# Patient Record
Sex: FEMALE | Race: WHITE | NOT HISPANIC OR LATINO | Employment: UNEMPLOYED | URBAN - METROPOLITAN AREA
[De-identification: names, ages, dates, MRNs, and addresses within clinical notes are randomized per-mention and may not be internally consistent; named-entity substitution may affect disease eponyms.]

---

## 2017-11-28 ENCOUNTER — HOSPITAL ENCOUNTER (INPATIENT)
Facility: MEDICAL CENTER | Age: 57
LOS: 3 days | DRG: 190 | End: 2017-12-02
Attending: EMERGENCY MEDICINE | Admitting: HOSPITALIST
Payer: COMMERCIAL

## 2017-11-28 ENCOUNTER — HOSPITAL ENCOUNTER (OUTPATIENT)
Dept: RADIOLOGY | Facility: MEDICAL CENTER | Age: 57
End: 2017-11-28

## 2017-11-28 ENCOUNTER — APPOINTMENT (OUTPATIENT)
Dept: RADIOLOGY | Facility: MEDICAL CENTER | Age: 57
DRG: 190 | End: 2017-11-28
Attending: EMERGENCY MEDICINE
Payer: COMMERCIAL

## 2017-11-28 DIAGNOSIS — J44.1 COPD EXACERBATION (HCC): Primary | ICD-10-CM

## 2017-11-28 LAB
BASOPHILS # BLD AUTO: 0.4 % (ref 0–1.8)
BASOPHILS # BLD: 0.04 K/UL (ref 0–0.12)
EOSINOPHIL # BLD AUTO: 0.01 K/UL (ref 0–0.51)
EOSINOPHIL NFR BLD: 0.1 % (ref 0–6.9)
ERYTHROCYTE [DISTWIDTH] IN BLOOD BY AUTOMATED COUNT: 41.7 FL (ref 35.9–50)
HCT VFR BLD AUTO: 34.1 % (ref 37–47)
HGB BLD-MCNC: 11.3 G/DL (ref 12–16)
IMM GRANULOCYTES # BLD AUTO: 0.03 K/UL (ref 0–0.11)
IMM GRANULOCYTES NFR BLD AUTO: 0.3 % (ref 0–0.9)
LYMPHOCYTES # BLD AUTO: 0.6 K/UL (ref 1–4.8)
LYMPHOCYTES NFR BLD: 6.7 % (ref 22–41)
MCH RBC QN AUTO: 29.2 PG (ref 27–33)
MCHC RBC AUTO-ENTMCNC: 33.1 G/DL (ref 33.6–35)
MCV RBC AUTO: 88.1 FL (ref 81.4–97.8)
MONOCYTES # BLD AUTO: 0.12 K/UL (ref 0–0.85)
MONOCYTES NFR BLD AUTO: 1.3 % (ref 0–13.4)
NEUTROPHILS # BLD AUTO: 8.12 K/UL (ref 2–7.15)
NEUTROPHILS NFR BLD: 91.2 % (ref 44–72)
NRBC # BLD AUTO: 0 K/UL
NRBC BLD AUTO-RTO: 0 /100 WBC
PLATELET # BLD AUTO: 268 K/UL (ref 164–446)
PMV BLD AUTO: 10.8 FL (ref 9–12.9)
RBC # BLD AUTO: 3.87 M/UL (ref 4.2–5.4)
TROPONIN I SERPL-MCNC: <0.01 NG/ML (ref 0–0.04)
WBC # BLD AUTO: 8.9 K/UL (ref 4.8–10.8)

## 2017-11-28 PROCEDURE — 71010 DX-CHEST-PORTABLE (1 VIEW): CPT

## 2017-11-28 PROCEDURE — 85025 COMPLETE CBC W/AUTO DIFF WBC: CPT

## 2017-11-28 PROCEDURE — 83036 HEMOGLOBIN GLYCOSYLATED A1C: CPT

## 2017-11-28 PROCEDURE — 94640 AIRWAY INHALATION TREATMENT: CPT

## 2017-11-28 PROCEDURE — 99285 EMERGENCY DEPT VISIT HI MDM: CPT

## 2017-11-28 PROCEDURE — 304561 HCHG STAT O2

## 2017-11-28 PROCEDURE — 84484 ASSAY OF TROPONIN QUANT: CPT

## 2017-11-28 PROCEDURE — 80053 COMPREHEN METABOLIC PANEL: CPT

## 2017-11-28 PROCEDURE — 93005 ELECTROCARDIOGRAM TRACING: CPT | Performed by: EMERGENCY MEDICINE

## 2017-11-28 PROCEDURE — 36415 COLL VENOUS BLD VENIPUNCTURE: CPT

## 2017-11-28 PROCEDURE — 304562 HCHG STAT O2 MASK/CANNULA

## 2017-11-28 PROCEDURE — 83880 ASSAY OF NATRIURETIC PEPTIDE: CPT

## 2017-11-28 PROCEDURE — 700101 HCHG RX REV CODE 250: Performed by: EMERGENCY MEDICINE

## 2017-11-28 RX ORDER — SODIUM CHLORIDE 9 MG/ML
1000 INJECTION, SOLUTION INTRAVENOUS ONCE
Status: DISCONTINUED | OUTPATIENT
Start: 2017-11-28 | End: 2017-11-28

## 2017-11-28 RX ORDER — IPRATROPIUM BROMIDE AND ALBUTEROL SULFATE 2.5; .5 MG/3ML; MG/3ML
3 SOLUTION RESPIRATORY (INHALATION)
Status: COMPLETED | OUTPATIENT
Start: 2017-11-28 | End: 2017-11-28

## 2017-11-28 RX ADMIN — IPRATROPIUM BROMIDE AND ALBUTEROL SULFATE 3 ML: .5; 3 SOLUTION RESPIRATORY (INHALATION) at 22:28

## 2017-11-28 ASSESSMENT — COPD QUESTIONNAIRES
DO YOU EVER COUGH UP ANY MUCUS OR PHLEGM?: YES, A FEW DAYS A WEEK OR MONTH
HAVE YOU SMOKED AT LEAST 100 CIGARETTES IN YOUR ENTIRE LIFE: YES
COPD SCREENING SCORE: 5
DURING THE PAST 4 WEEKS HOW MUCH DID YOU FEEL SHORT OF BREATH: SOME OF THE TIME

## 2017-11-28 ASSESSMENT — LIFESTYLE VARIABLES: EVER_SMOKED: YES

## 2017-11-28 ASSESSMENT — PAIN SCALES - GENERAL: PAINLEVEL_OUTOF10: 4

## 2017-11-29 ENCOUNTER — RESOLUTE PROFESSIONAL BILLING HOSPITAL PROF FEE (OUTPATIENT)
Dept: HOSPITALIST | Facility: MEDICAL CENTER | Age: 57
End: 2017-11-29
Payer: COMMERCIAL

## 2017-11-29 PROBLEM — J44.1 COPD EXACERBATION (HCC): Status: ACTIVE | Noted: 2017-11-29

## 2017-11-29 PROBLEM — J96.21 ACUTE ON CHRONIC RESPIRATORY FAILURE WITH HYPOXIA (HCC): Status: ACTIVE | Noted: 2017-11-29

## 2017-11-29 PROBLEM — F17.200 TOBACCO DEPENDENCE: Status: ACTIVE | Noted: 2017-11-29

## 2017-11-29 LAB
ALBUMIN SERPL BCP-MCNC: 3.7 G/DL (ref 3.2–4.9)
ALBUMIN/GLOB SERPL: 1.2 G/DL
ALP SERPL-CCNC: 122 U/L (ref 30–99)
ALT SERPL-CCNC: 11 U/L (ref 2–50)
ANION GAP SERPL CALC-SCNC: 15 MMOL/L (ref 0–11.9)
ANION GAP SERPL CALC-SCNC: 8 MMOL/L (ref 0–11.9)
AST SERPL-CCNC: 13 U/L (ref 12–45)
BASOPHILS # BLD AUTO: 0.3 % (ref 0–1.8)
BASOPHILS # BLD: 0.02 K/UL (ref 0–0.12)
BILIRUB SERPL-MCNC: 0.3 MG/DL (ref 0.1–1.5)
BNP SERPL-MCNC: 31 PG/ML (ref 0–100)
BUN SERPL-MCNC: 12 MG/DL (ref 8–22)
BUN SERPL-MCNC: 16 MG/DL (ref 8–22)
CALCIUM SERPL-MCNC: 9.5 MG/DL (ref 8.5–10.5)
CALCIUM SERPL-MCNC: 9.5 MG/DL (ref 8.5–10.5)
CHLORIDE SERPL-SCNC: 101 MMOL/L (ref 96–112)
CHLORIDE SERPL-SCNC: 104 MMOL/L (ref 96–112)
CO2 SERPL-SCNC: 19 MMOL/L (ref 20–33)
CO2 SERPL-SCNC: 24 MMOL/L (ref 20–33)
CREAT SERPL-MCNC: 0.59 MG/DL (ref 0.5–1.4)
CREAT SERPL-MCNC: 0.77 MG/DL (ref 0.5–1.4)
EOSINOPHIL # BLD AUTO: 0 K/UL (ref 0–0.51)
EOSINOPHIL NFR BLD: 0 % (ref 0–6.9)
ERYTHROCYTE [DISTWIDTH] IN BLOOD BY AUTOMATED COUNT: 41.1 FL (ref 35.9–50)
GFR SERPL CREATININE-BSD FRML MDRD: >60 ML/MIN/1.73 M 2
GFR SERPL CREATININE-BSD FRML MDRD: >60 ML/MIN/1.73 M 2
GLOBULIN SER CALC-MCNC: 3.1 G/DL (ref 1.9–3.5)
GLUCOSE BLD-MCNC: 263 MG/DL (ref 65–99)
GLUCOSE BLD-MCNC: 287 MG/DL (ref 65–99)
GLUCOSE BLD-MCNC: 325 MG/DL (ref 65–99)
GLUCOSE BLD-MCNC: 357 MG/DL (ref 65–99)
GLUCOSE SERPL-MCNC: 285 MG/DL (ref 65–99)
GLUCOSE SERPL-MCNC: 420 MG/DL (ref 65–99)
HCT VFR BLD AUTO: 35.1 % (ref 37–47)
HGB BLD-MCNC: 11.8 G/DL (ref 12–16)
IMM GRANULOCYTES # BLD AUTO: 0.04 K/UL (ref 0–0.11)
IMM GRANULOCYTES NFR BLD AUTO: 0.5 % (ref 0–0.9)
LYMPHOCYTES # BLD AUTO: 0.69 K/UL (ref 1–4.8)
LYMPHOCYTES NFR BLD: 9.3 % (ref 22–41)
MCH RBC QN AUTO: 29.2 PG (ref 27–33)
MCHC RBC AUTO-ENTMCNC: 33.6 G/DL (ref 33.6–35)
MCV RBC AUTO: 86.9 FL (ref 81.4–97.8)
MONOCYTES # BLD AUTO: 0.2 K/UL (ref 0–0.85)
MONOCYTES NFR BLD AUTO: 2.7 % (ref 0–13.4)
NEUTROPHILS # BLD AUTO: 6.43 K/UL (ref 2–7.15)
NEUTROPHILS NFR BLD: 87.2 % (ref 44–72)
NRBC # BLD AUTO: 0 K/UL
NRBC BLD AUTO-RTO: 0 /100 WBC
PLATELET # BLD AUTO: 285 K/UL (ref 164–446)
PMV BLD AUTO: 10.4 FL (ref 9–12.9)
POTASSIUM SERPL-SCNC: 3.6 MMOL/L (ref 3.6–5.5)
POTASSIUM SERPL-SCNC: 4.5 MMOL/L (ref 3.6–5.5)
PROT SERPL-MCNC: 6.8 G/DL (ref 6–8.2)
RBC # BLD AUTO: 4.04 M/UL (ref 4.2–5.4)
SODIUM SERPL-SCNC: 135 MMOL/L (ref 135–145)
SODIUM SERPL-SCNC: 136 MMOL/L (ref 135–145)
WBC # BLD AUTO: 7.4 K/UL (ref 4.8–10.8)

## 2017-11-29 PROCEDURE — A9270 NON-COVERED ITEM OR SERVICE: HCPCS | Performed by: HOSPITALIST

## 2017-11-29 PROCEDURE — 700105 HCHG RX REV CODE 258: Performed by: HOSPITALIST

## 2017-11-29 PROCEDURE — 700102 HCHG RX REV CODE 250 W/ 637 OVERRIDE(OP): Performed by: HOSPITALIST

## 2017-11-29 PROCEDURE — 770006 HCHG ROOM/CARE - MED/SURG/GYN SEMI*

## 2017-11-29 PROCEDURE — 94760 N-INVAS EAR/PLS OXIMETRY 1: CPT

## 2017-11-29 PROCEDURE — 82962 GLUCOSE BLOOD TEST: CPT | Mod: 91

## 2017-11-29 PROCEDURE — 36415 COLL VENOUS BLD VENIPUNCTURE: CPT

## 2017-11-29 PROCEDURE — 99406 BEHAV CHNG SMOKING 3-10 MIN: CPT

## 2017-11-29 PROCEDURE — 94640 AIRWAY INHALATION TREATMENT: CPT

## 2017-11-29 PROCEDURE — 700117 HCHG RX CONTRAST REV CODE 255: Performed by: EMERGENCY MEDICINE

## 2017-11-29 PROCEDURE — 700101 HCHG RX REV CODE 250: Performed by: EMERGENCY MEDICINE

## 2017-11-29 PROCEDURE — 85025 COMPLETE CBC W/AUTO DIFF WBC: CPT

## 2017-11-29 PROCEDURE — 71275 CT ANGIOGRAPHY CHEST: CPT

## 2017-11-29 PROCEDURE — 80048 BASIC METABOLIC PNL TOTAL CA: CPT

## 2017-11-29 PROCEDURE — 99223 1ST HOSP IP/OBS HIGH 75: CPT | Performed by: HOSPITALIST

## 2017-11-29 PROCEDURE — 700111 HCHG RX REV CODE 636 W/ 250 OVERRIDE (IP): Performed by: HOSPITALIST

## 2017-11-29 RX ORDER — ONDANSETRON 4 MG/1
4 TABLET, ORALLY DISINTEGRATING ORAL EVERY 4 HOURS PRN
Status: DISCONTINUED | OUTPATIENT
Start: 2017-11-29 | End: 2017-12-02 | Stop reason: HOSPADM

## 2017-11-29 RX ORDER — AZITHROMYCIN 250 MG/1
250 TABLET, FILM COATED ORAL DAILY
Status: DISCONTINUED | OUTPATIENT
Start: 2017-11-30 | End: 2017-12-02 | Stop reason: HOSPADM

## 2017-11-29 RX ORDER — ACETAMINOPHEN 325 MG/1
650 TABLET ORAL EVERY 6 HOURS PRN
Status: DISCONTINUED | OUTPATIENT
Start: 2017-11-29 | End: 2017-12-02 | Stop reason: HOSPADM

## 2017-11-29 RX ORDER — DEXTROSE MONOHYDRATE 25 G/50ML
25 INJECTION, SOLUTION INTRAVENOUS
Status: DISCONTINUED | OUTPATIENT
Start: 2017-11-29 | End: 2017-12-02 | Stop reason: HOSPADM

## 2017-11-29 RX ORDER — LOSARTAN POTASSIUM 50 MG/1
50 TABLET ORAL DAILY
Status: DISCONTINUED | OUTPATIENT
Start: 2017-11-29 | End: 2017-12-02 | Stop reason: HOSPADM

## 2017-11-29 RX ORDER — BUPROPION HYDROCHLORIDE 150 MG/1
150 TABLET, EXTENDED RELEASE ORAL 2 TIMES DAILY
Status: DISCONTINUED | OUTPATIENT
Start: 2017-11-29 | End: 2017-12-02 | Stop reason: HOSPADM

## 2017-11-29 RX ORDER — OXYCODONE HYDROCHLORIDE 5 MG/1
5 TABLET ORAL
Status: DISCONTINUED | OUTPATIENT
Start: 2017-11-29 | End: 2017-12-02 | Stop reason: HOSPADM

## 2017-11-29 RX ORDER — INSULIN GLARGINE 100 [IU]/ML
30 INJECTION, SOLUTION SUBCUTANEOUS 2 TIMES DAILY
Status: DISCONTINUED | OUTPATIENT
Start: 2017-11-29 | End: 2017-12-02 | Stop reason: HOSPADM

## 2017-11-29 RX ORDER — METHYLPREDNISOLONE SODIUM SUCCINATE 40 MG/ML
40 INJECTION, POWDER, LYOPHILIZED, FOR SOLUTION INTRAMUSCULAR; INTRAVENOUS EVERY 6 HOURS
Status: DISCONTINUED | OUTPATIENT
Start: 2017-11-29 | End: 2017-11-30

## 2017-11-29 RX ORDER — OMEPRAZOLE 20 MG/1
20 CAPSULE, DELAYED RELEASE ORAL DAILY
Status: DISCONTINUED | OUTPATIENT
Start: 2017-11-29 | End: 2017-12-02 | Stop reason: HOSPADM

## 2017-11-29 RX ORDER — HEPARIN SODIUM 5000 [USP'U]/ML
5000 INJECTION, SOLUTION INTRAVENOUS; SUBCUTANEOUS EVERY 8 HOURS
Status: DISCONTINUED | OUTPATIENT
Start: 2017-11-29 | End: 2017-12-02 | Stop reason: HOSPADM

## 2017-11-29 RX ORDER — SODIUM CHLORIDE 9 MG/ML
INJECTION, SOLUTION INTRAVENOUS CONTINUOUS
Status: ACTIVE | OUTPATIENT
Start: 2017-11-29 | End: 2017-11-29

## 2017-11-29 RX ORDER — BISACODYL 10 MG
10 SUPPOSITORY, RECTAL RECTAL
Status: DISCONTINUED | OUTPATIENT
Start: 2017-11-29 | End: 2017-12-02 | Stop reason: HOSPADM

## 2017-11-29 RX ORDER — TIOTROPIUM BROMIDE 18 UG/1
1 CAPSULE ORAL; RESPIRATORY (INHALATION) DAILY
Status: DISCONTINUED | OUTPATIENT
Start: 2017-11-29 | End: 2017-12-02 | Stop reason: HOSPADM

## 2017-11-29 RX ORDER — ESCITALOPRAM OXALATE 10 MG/1
20 TABLET ORAL DAILY
Status: DISCONTINUED | OUTPATIENT
Start: 2017-11-29 | End: 2017-12-02 | Stop reason: HOSPADM

## 2017-11-29 RX ORDER — LORAZEPAM 2 MG/ML
0.5 INJECTION INTRAMUSCULAR EVERY 6 HOURS PRN
Status: DISCONTINUED | OUTPATIENT
Start: 2017-11-29 | End: 2017-12-02 | Stop reason: HOSPADM

## 2017-11-29 RX ORDER — PROMETHAZINE HYDROCHLORIDE 25 MG/1
12.5-25 SUPPOSITORY RECTAL EVERY 4 HOURS PRN
Status: DISCONTINUED | OUTPATIENT
Start: 2017-11-29 | End: 2017-12-02 | Stop reason: HOSPADM

## 2017-11-29 RX ORDER — GABAPENTIN 300 MG/1
300 CAPSULE ORAL 3 TIMES DAILY
Status: DISCONTINUED | OUTPATIENT
Start: 2017-11-29 | End: 2017-12-02 | Stop reason: HOSPADM

## 2017-11-29 RX ORDER — IPRATROPIUM BROMIDE AND ALBUTEROL SULFATE 2.5; .5 MG/3ML; MG/3ML
3 SOLUTION RESPIRATORY (INHALATION)
Status: DISCONTINUED | OUTPATIENT
Start: 2017-11-29 | End: 2017-11-30

## 2017-11-29 RX ORDER — POLYETHYLENE GLYCOL 3350 17 G/17G
1 POWDER, FOR SOLUTION ORAL
Status: DISCONTINUED | OUTPATIENT
Start: 2017-11-29 | End: 2017-12-02 | Stop reason: HOSPADM

## 2017-11-29 RX ORDER — ONDANSETRON 2 MG/ML
4 INJECTION INTRAMUSCULAR; INTRAVENOUS EVERY 4 HOURS PRN
Status: DISCONTINUED | OUTPATIENT
Start: 2017-11-29 | End: 2017-12-02 | Stop reason: HOSPADM

## 2017-11-29 RX ORDER — LORAZEPAM 1 MG/1
0.5 TABLET ORAL EVERY 6 HOURS PRN
Status: DISCONTINUED | OUTPATIENT
Start: 2017-11-29 | End: 2017-12-02 | Stop reason: HOSPADM

## 2017-11-29 RX ORDER — NICOTINE 21 MG/24HR
14 PATCH, TRANSDERMAL 24 HOURS TRANSDERMAL
Status: DISCONTINUED | OUTPATIENT
Start: 2017-11-29 | End: 2017-12-02 | Stop reason: HOSPADM

## 2017-11-29 RX ORDER — LEVOTHYROXINE SODIUM 0.05 MG/1
50 TABLET ORAL
Status: DISCONTINUED | OUTPATIENT
Start: 2017-11-29 | End: 2017-12-02 | Stop reason: HOSPADM

## 2017-11-29 RX ORDER — CYCLOBENZAPRINE HCL 10 MG
10 TABLET ORAL 3 TIMES DAILY PRN
Status: DISCONTINUED | OUTPATIENT
Start: 2017-11-29 | End: 2017-12-02 | Stop reason: HOSPADM

## 2017-11-29 RX ORDER — AMOXICILLIN 250 MG
2 CAPSULE ORAL 2 TIMES DAILY
Status: DISCONTINUED | OUTPATIENT
Start: 2017-11-29 | End: 2017-12-02 | Stop reason: HOSPADM

## 2017-11-29 RX ORDER — FLUTICASONE PROPIONATE 50 MCG
1 SPRAY, SUSPENSION (ML) NASAL 2 TIMES DAILY
Status: DISCONTINUED | OUTPATIENT
Start: 2017-11-29 | End: 2017-12-02 | Stop reason: HOSPADM

## 2017-11-29 RX ORDER — AZITHROMYCIN 250 MG/1
500 TABLET, FILM COATED ORAL ONCE
Status: COMPLETED | OUTPATIENT
Start: 2017-11-29 | End: 2017-11-29

## 2017-11-29 RX ORDER — PROMETHAZINE HYDROCHLORIDE 25 MG/1
12.5-25 TABLET ORAL EVERY 4 HOURS PRN
Status: DISCONTINUED | OUTPATIENT
Start: 2017-11-29 | End: 2017-12-02 | Stop reason: HOSPADM

## 2017-11-29 RX ADMIN — IOHEXOL 75 ML: 350 INJECTION, SOLUTION INTRAVENOUS at 01:00

## 2017-11-29 RX ADMIN — OMEPRAZOLE 20 MG: 20 CAPSULE, DELAYED RELEASE ORAL at 08:10

## 2017-11-29 RX ADMIN — INSULIN HUMAN 7 UNITS: 100 INJECTION, SOLUTION PARENTERAL at 13:40

## 2017-11-29 RX ADMIN — SODIUM CHLORIDE: 9 INJECTION, SOLUTION INTRAVENOUS at 03:38

## 2017-11-29 RX ADMIN — IPRATROPIUM BROMIDE AND ALBUTEROL SULFATE 3 ML: .5; 3 SOLUTION RESPIRATORY (INHALATION) at 14:11

## 2017-11-29 RX ADMIN — INSULIN HUMAN 7 UNITS: 100 INJECTION, SOLUTION PARENTERAL at 21:19

## 2017-11-29 RX ADMIN — ESCITALOPRAM OXALATE 20 MG: 10 TABLET ORAL at 09:00

## 2017-11-29 RX ADMIN — HEPARIN SODIUM 5000 UNITS: 5000 INJECTION, SOLUTION INTRAVENOUS; SUBCUTANEOUS at 21:16

## 2017-11-29 RX ADMIN — OXYCODONE HYDROCHLORIDE 5 MG: 5 TABLET ORAL at 08:26

## 2017-11-29 RX ADMIN — METOPROLOL TARTRATE 25 MG: 25 TABLET, FILM COATED ORAL at 08:09

## 2017-11-29 RX ADMIN — INSULIN HUMAN 12 UNITS: 100 INJECTION, SOLUTION PARENTERAL at 07:09

## 2017-11-29 RX ADMIN — METHYLPREDNISOLONE SODIUM SUCCINATE 40 MG: 40 INJECTION, POWDER, FOR SOLUTION INTRAMUSCULAR; INTRAVENOUS at 06:24

## 2017-11-29 RX ADMIN — GABAPENTIN 300 MG: 300 CAPSULE ORAL at 08:09

## 2017-11-29 RX ADMIN — BUPROPION HYDROCHLORIDE 150 MG: 150 TABLET, EXTENDED RELEASE ORAL at 21:16

## 2017-11-29 RX ADMIN — INSULIN GLARGINE 30 UNITS: 100 INJECTION, SOLUTION SUBCUTANEOUS at 21:20

## 2017-11-29 RX ADMIN — IPRATROPIUM BROMIDE AND ALBUTEROL SULFATE 3 ML: .5; 3 SOLUTION RESPIRATORY (INHALATION) at 20:14

## 2017-11-29 RX ADMIN — ACETAMINOPHEN 650 MG: 325 TABLET, FILM COATED ORAL at 08:26

## 2017-11-29 RX ADMIN — ACETAMINOPHEN 650 MG: 325 TABLET, FILM COATED ORAL at 18:37

## 2017-11-29 RX ADMIN — NICOTINE 14 MG: 14 PATCH, EXTENDED RELEASE TRANSDERMAL at 06:24

## 2017-11-29 RX ADMIN — LEVOTHYROXINE SODIUM 50 MCG: 50 TABLET ORAL at 06:24

## 2017-11-29 RX ADMIN — TIOTROPIUM BROMIDE 1 CAPSULE: 18 CAPSULE ORAL; RESPIRATORY (INHALATION) at 08:15

## 2017-11-29 RX ADMIN — INSULIN HUMAN 10 UNITS: 100 INJECTION, SOLUTION PARENTERAL at 18:33

## 2017-11-29 RX ADMIN — IPRATROPIUM BROMIDE AND ALBUTEROL SULFATE 3 ML: .5; 3 SOLUTION RESPIRATORY (INHALATION) at 11:00

## 2017-11-29 RX ADMIN — HEPARIN SODIUM 5000 UNITS: 5000 INJECTION, SOLUTION INTRAVENOUS; SUBCUTANEOUS at 13:46

## 2017-11-29 RX ADMIN — METHYLPREDNISOLONE SODIUM SUCCINATE 40 MG: 40 INJECTION, POWDER, FOR SOLUTION INTRAMUSCULAR; INTRAVENOUS at 18:37

## 2017-11-29 RX ADMIN — OXYCODONE HYDROCHLORIDE 5 MG: 5 TABLET ORAL at 18:37

## 2017-11-29 RX ADMIN — LOSARTAN POTASSIUM 50 MG: 50 TABLET, FILM COATED ORAL at 08:09

## 2017-11-29 RX ADMIN — METHYLPREDNISOLONE SODIUM SUCCINATE 40 MG: 40 INJECTION, POWDER, FOR SOLUTION INTRAMUSCULAR; INTRAVENOUS at 23:47

## 2017-11-29 RX ADMIN — GABAPENTIN 300 MG: 300 CAPSULE ORAL at 21:16

## 2017-11-29 RX ADMIN — AZITHROMYCIN 500 MG: 250 TABLET, FILM COATED ORAL at 03:33

## 2017-11-29 RX ADMIN — HEPARIN SODIUM 5000 UNITS: 5000 INJECTION, SOLUTION INTRAVENOUS; SUBCUTANEOUS at 06:24

## 2017-11-29 RX ADMIN — GABAPENTIN 300 MG: 300 CAPSULE ORAL at 13:47

## 2017-11-29 RX ADMIN — INSULIN GLARGINE 30 UNITS: 100 INJECTION, SOLUTION SUBCUTANEOUS at 06:38

## 2017-11-29 RX ADMIN — METHYLPREDNISOLONE SODIUM SUCCINATE 40 MG: 40 INJECTION, POWDER, FOR SOLUTION INTRAMUSCULAR; INTRAVENOUS at 13:47

## 2017-11-29 RX ADMIN — METOPROLOL TARTRATE 25 MG: 25 TABLET, FILM COATED ORAL at 21:16

## 2017-11-29 RX ADMIN — CYCLOBENZAPRINE 10 MG: 10 TABLET, FILM COATED ORAL at 11:44

## 2017-11-29 RX ADMIN — STANDARDIZED SENNA CONCENTRATE AND DOCUSATE SODIUM 2 TABLET: 8.6; 5 TABLET, FILM COATED ORAL at 21:16

## 2017-11-29 RX ADMIN — FLUTICASONE PROPIONATE 50 MCG: 50 SPRAY, METERED NASAL at 08:11

## 2017-11-29 RX ADMIN — BUPROPION HYDROCHLORIDE 150 MG: 150 TABLET, EXTENDED RELEASE ORAL at 08:10

## 2017-11-29 ASSESSMENT — LIFESTYLE VARIABLES
ALCOHOL_USE: NO
EVER_SMOKED: YES

## 2017-11-29 ASSESSMENT — PATIENT HEALTH QUESTIONNAIRE - PHQ9
SUM OF ALL RESPONSES TO PHQ9 QUESTIONS 1 AND 2: 2
7. TROUBLE CONCENTRATING ON THINGS, SUCH AS READING THE NEWSPAPER OR WATCHING TELEVISION: SEVERAL DAYS
5. POOR APPETITE OR OVEREATING: NOT AT ALL
3. TROUBLE FALLING OR STAYING ASLEEP OR SLEEPING TOO MUCH: SEVERAL DAYS
4. FEELING TIRED OR HAVING LITTLE ENERGY: SEVERAL DAYS
1. LITTLE INTEREST OR PLEASURE IN DOING THINGS: SEVERAL DAYS
6. FEELING BAD ABOUT YOURSELF - OR THAT YOU ARE A FAILURE OR HAVE LET YOURSELF OR YOUR FAMILY DOWN: NOT AL ALL
9. THOUGHTS THAT YOU WOULD BE BETTER OFF DEAD, OR OF HURTING YOURSELF: NOT AT ALL
2. FEELING DOWN, DEPRESSED, IRRITABLE, OR HOPELESS: SEVERAL DAYS
8. MOVING OR SPEAKING SO SLOWLY THAT OTHER PEOPLE COULD HAVE NOTICED. OR THE OPPOSITE, BEING SO FIGETY OR RESTLESS THAT YOU HAVE BEEN MOVING AROUND A LOT MORE THAN USUAL: SEVERAL DAYS
SUM OF ALL RESPONSES TO PHQ QUESTIONS 1-9: 6

## 2017-11-29 ASSESSMENT — COGNITIVE AND FUNCTIONAL STATUS - GENERAL
WALKING IN HOSPITAL ROOM: A LITTLE
SUGGESTED CMS G CODE MODIFIER DAILY ACTIVITY: CJ
STANDING UP FROM CHAIR USING ARMS: A LITTLE
HELP NEEDED FOR BATHING: A LITTLE
DRESSING REGULAR LOWER BODY CLOTHING: A LITTLE
MOVING FROM LYING ON BACK TO SITTING ON SIDE OF FLAT BED: A LITTLE
CLIMB 3 TO 5 STEPS WITH RAILING: A LOT
MOBILITY SCORE: 18
DRESSING REGULAR UPPER BODY CLOTHING: A LITTLE
SUGGESTED CMS G CODE MODIFIER MOBILITY: CK
TURNING FROM BACK TO SIDE WHILE IN FLAT BAD: A LITTLE
DAILY ACTIVITIY SCORE: 21

## 2017-11-29 ASSESSMENT — PAIN SCALES - GENERAL
PAINLEVEL_OUTOF10: 0
PAINLEVEL_OUTOF10: 0
PAINLEVEL_OUTOF10: 7

## 2017-11-29 NOTE — ED PROVIDER NOTES
ED Provider Note    Scribed for Lashonda Omalley M.D. by Guy Marcano. 11/28/2017, 10:21 PM.    Primary care provider: None  Means of arrival: ambulance  History obtained from: Patient, EMS, transfer notes  History limited by: none    CHIEF COMPLAINT  Chief Complaint   Patient presents with   • Shortness of Breath     Pt is transfer from [facility] for higher level of care. Recieved report from flight crew.  Pt with a light non productive cough, RR 24.  pt states symptoms for last couple of days worsening here to r/o pulmonary edema.       HPI  Joel Day is a 57 y.o. female who presents to the Emergency Department as a transfer from [place] for evaluation of shortness of breath onset two days ago. Per patient, she has been experiencing assocaited cough, left sided chest wall discomfort for the past couple of days with mild shortness of breath. However, she developed worsening shorntess of breath that has been constant since this morning. Paitent does not note any exacerbating or alleviating factors. She confirms a history of diabetes and cancer. The patient states she is not currently undergoing cancer treatment, as her doctor is still assessing what type of cancer she has. Patient adds she occasionally smokes cigarettes. The patient denies a history of COPD. Per EMS, the patient was given Albuterol and Duoneb en route to [facility]. She denies fever, nausea, vomiting.      REVIEW OF SYSTEMS  See HPI for further details. All other systems are negative.     C.      PAST MEDICAL HISTORY   has a past medical history of COPD (chronic obstructive pulmonary disease); Hypertension; and Type II or unspecified type diabetes mellitus without mention of complication, not stated as uncontrolled.    SURGICAL HISTORY  patient denies any surgical history    SOCIAL HISTORY  Social History   Substance Use Topics   • Smoking status: Current Every Day Smoker     Types: Cigarettes   • Alcohol use No      History   Drug Use  "No       FAMILY HISTORY  No history pertinent to complaint.    CURRENT MEDICATIONS  Reviewed. See Encounter Summary.     ALLERGIES  Allergies   Allergen Reactions   • Webster Oil      ALL almonds     • Codeine    • Pcn [Penicillins]    • Simvastatin        PHYSICAL EXAM  VITAL SIGNS: /70   Pulse (!) 110   Temp 37.1 °C (98.7 °F)   Resp (!) 24   Ht 1.676 m (5' 6\")   Wt 77.1 kg (170 lb)   SpO2 94%   BMI 27.44 kg/m²    Pulse ox interpretation: I interpret this pulse ox as normal.  Constitutional: Alert in no apparent distress.  HENT: No signs of trauma, Bilateral external ears normal, Nose normal.   Eyes: PERR, Conjunctiva normal, Non-icteric.   Neck: Normal range of motion, No tenderness, Supple, No stridor.   Lymphatic: No lymphadenopathy noted.   Cardiovascular: Mildly tachycardic. Regular rhythm, no murmurs.   Thorax & Lungs: Coarse rhoncherous breath sounds bilaterally, Mildly tachypnic without accessory muscle use, able to speak full sentences. No wheezing, No chest tenderness.   Abdomen: Bowel sounds normal, Soft, No tenderness, No pulsatile masses. No peritoneal signs.  Skin: Warm, Dry, No erythema, No rash.   Back: No bony tenderness, No CVA tenderness.   Extremities: Intact distal pulses, No edema, No tenderness, No cyanosis  Musculoskeletal: Good range of motion in all major joints. No tenderness to palpation or major deformities noted.   Neurologic: Alert and oriented, No focal deficits noted.       DIFFERENTIAL DIAGNOSIS AND WORK UP PLAN    10:21 PM Patient seen and examined at bedside. The patient presents with shortness of breath and the differential diagnosis includes but is not limited to community acquired pneumonia, pulmonary edema from pulmonary embolus, ACS, hypertensive emergency prior to arrival, liver failure, renal failure. Ordered for chest x-ray, CTA chest CT, BNP, CBC with differential, CMP, Troponin, EKG to evaluate. Patient will be treated with 3 mL Duoneb for her symptoms. I " reviewed the the note from the transfer facility, which indicated the following: Elevated glucose without DKA, elevated D-dimer with negative troponin, normal chemistry, normal CBC. EKG from transfer facility shows sinus tachycardia. Treated with broad spectrum antibiotics for possible pneumonia.     DIAGNOSTIC STUDIES / PROCEDURES     LABS  CBC within normal limits with a left shift and a mild anemia, CMP consistent with a mild acidosis elevated glucose without evidence of DKA and troponin negative BNP negative  All labs were reviewed by me.    EKG  12- Lead EKG; interpreted by myself - Lyssa  Sinus tachycardia with a rate of 108 bpm.   Normal axis.  Normal intervals.   No ST elevation or depression, or abnormal T wave inversion   No widening of QRS complex   Good R wave progression   Borderline inferior Q waves  No prior EKG for comparison  Clinical Impression: Sinus tachycardia with borderline inferior Q waves      RADIOLOGY  CT-CTA CHEST PULMONARY ARTERY W/ RECONS   Final Result      No CT evidence of pulmonary thromboembolism      Mild bronchial wall thickening is suspicious for edema. Bronchitis is also possible      Apical emphysema/scarring      Stable from 2015 1 cm left thyroid lobe mass            DX-CHEST-PORTABLE (1 VIEW)   Final Result      Mild basilar opacity most likely represents atelectasis      OUTSIDE IMAGES-DX CHEST   Final Result        The radiologist's interpretation of all radiological studies have been reviewed by me.    COURSE & MEDICAL DECISION MAKING  Pertinent Labs & Imaging studies reviewed. (See chart for details)    11:39 PM - I reevaluated the patient at bedside. Patient states she is still feeling short of breath, and is unsure if the breathing treatment improved her symptoms. The patient is not tachypnic and is able to to speak in full sentences.     12:37 AM - I discussed the patient's case and the above findings with Dr. Arshad (Hospitalist) who agrees to admit the patient.         This is a 57 y.o. year old female who presents with shortness of breath and pulmonary edema and outside chest x-ray with some hypoxia prior to arrival. She is doing well on only 1-2 L nasal cannula, no evidence of pulmonary embolus she was treated with antibiotics for possible community acquired pneumonia. She does not have an elevated white blood cell count and the lack of a PE - supply so cardiac dysfunction versus pneumonia.  The patient will be admitted to the hospital     DISPOSITION:  Patient will be admitted to Dr. Arshad in guarded condition.     FINAL IMPRESSION  1. Pulmonary edema  2. Shortness of breath     Guy SHETTY (Scribe), am scribing for, and in the presence of, Lashonda Omalley M.D..    Electronically signed by: Guy Marcano (Scribe), 11/28/2017    Lashonda SHETTY M.D. personally performed the services described in this documentation, as scribed by Guy Marcano in my presence, and it is both accurate and complete.    The note accurately reflects work and decisions made by me.  Lashonda Omalley  11/29/2017  5:15 AM    This dictation has been created using voice recognition software and/or scribes. The accuracy of the dictation is limited by the abilities of the software and the expertise of the scribes. I expect there may be some errors of grammar and possibly content. I made every attempt to manually correct the errors within my dictation. However, errors related to voice recognition software and/or scribes may still exist and should be interpreted within the appropriate context.

## 2017-11-29 NOTE — H&P
DATE OF ADMISSION:  11/29/2017    PRIMARY CARE PHYSICIAN:  She does not have one.    CHIEF COMPLAINT:  Shortness of breath.    HISTORY OF PRESENT ILLNESS:  Patient is a 57-year-old female that was   transferred here from [place] due to shortness of breath.  Patient reports   that she had a viral illness few weeks ago and since that time period, she has   had a persistent cough, it waxes and wanes, but over the past couple of days   it has been much worse and now she also has increasing shortness of breath.    She states that she is on 3 L of oxygen at night.  She denies ever being   diagnosed as having COPD, but states that she continues to smoke occasionally,   but cannot quantify how much.  She was evaluated at an outside hospital and   there was concern because her D-dimer was elevated and there was possible   pulmonary edema.  Patient denies any recent hormone therapy and no recent   travel.  She has no other complaints.  She denies any fevers or chills.    REVIEW OF SYSTEMS:  As per HPI.  All other systems have been reviewed and are   negative.    PAST MEDICAL HISTORY:  1.  Diabetes.  2.  Diabetic neuropathy.  3.  Chronic respiratory failure with hypoxia on 3 liters of oxygen at bedtime.  4.  Lymphedema.  5.  Hyperlipidemia.  6.  Hypertension.  7.  Hypothyroidism.    PAST SURGICAL HISTORY:  She has had some type of nonmalignant breast tumor   resections and hysterectomy.    SOCIAL HISTORY:  She smokes cigarettes occasionally, but cannot quantify.    Denies alcohol or drug use.    FAMILY HISTORY:  Has been reviewed and is not pertinent to her current   presentation.    HOME MEDICATIONS:  1.  Oxycodone as needed.  2.  Lantus 30 units b.i.d.  3.  TriCor.  4.  Gabapentin 300 mg 3 times a day.  5.  Wellbutrin.  6.  Elavil.  7.  Omeprazole.  8.  Spiriva.  9.  Flonase.  10.  Lexapro 20 mg daily.  11.  Synthroid 50 mcg daily.  12.  Cozaar 50 mg daily.  13.  Metoprolol 25 mg b.i.d.  14.  NovoLog sliding scale.  15.   Naprosyn.  16.  Flexeril.    PHYSICAL EXAMINATION:  VITAL SIGNS:  Blood pressure is 150/82, pulse of 105, respirations 23,   temperature 37.1 and oxygen saturation 96% on 4 L nasal cannula.  Weight is 77   kg.  GENERAL:  Patient appears chronically ill, currently in mild respiratory   distress.  HEENT:  Dry mucous membranes.  No oropharyngeal exudates.  Eyes, EOMI, PERRLA.  NECK:  No lymphadenopathy, no JVD.  CARDIOVASCULAR:  Tachycardic in the low 100s, regular rhythm, no murmurs.  LUNGS:  She has diffuse expiratory wheezes in all lung fields and decreased   air entry.  She also has scattered rales.  ABDOMEN:  Positive bowel sounds, soft, nontender, nondistended.  EXTREMITIES:  No clubbing, cyanosis, or edema.  NEUROLOGICAL:  She is awake, alert, and oriented to person, place, time, and   situation.    LABORATORY DATA:  WBC is 8.9, hemoglobin 11.3, hematocrit 34.1 and platelets   260.  Sodium 135, potassium is 3.6, BUN 12, creatinine 0.77, glucose 420,   anion gap of 15.  Bicarbonate is 19.    IMAGING:  CTA of her chest shows no evidence of pulmonary thromboembolism.    Apical emphysema and scarring.    ASSESSMENT AND PLAN:  Patient is a 57-year-old female that presents to the   hospital complaining of shortness of breath.  1.  Acute chronic obstructive pulmonary disease exacerbation.  We will   initiate chronic obstructive pulmonary disease protocol.  We will treat her   with RT protocol, breathing treatments and antibiotics.  I have also   encouraged her regarding tobacco cessation.  She really should stop smoking,   this is unlikely to be cardiac in origin.  Her BNP is unremarkable.  2.  Acute on chronic respiratory failure with hypoxia.  Continue to wean her   off of oxygen as tolerated and continue with breathing treatments and RT   protocol.  3.  Increased anion gap metabolic acidosis with hyperglycemia.  Continue with   her Lantus and sliding scale insulin.  We will check a hemoglobin A1c and   treated with  IV fluids as well.  4.  Deep venous thrombosis prophylaxis.  5.  She is a full code.       ____________________________________     MD TEDDY Nelson / SHAQUILLE    DD:  11/29/2017 02:44:08  DT:  11/29/2017 02:58:10    D#:  [id number]  Job#:  [id number]

## 2017-11-29 NOTE — RESPIRATORY CARE
COPD EDUCATION by COPD CLINICAL EDUCATOR  11/29/2017 at 11:42 AM by Nandini Frye     Patient interviewed by COPD education team. Patient refused COPD program at this time. A comprehensive packet including information about COPD, treatments, and smoking cessation given.    Provided our contact information should she want further information

## 2017-11-29 NOTE — ED NOTES
"Talking with pt in next room and this pt was screaming \"be quite, shut up! God do I have to hear you yelling at another pt all night long God!\" I shut her door and did not engage her rude behavior  "

## 2017-11-29 NOTE — PROGRESS NOTES
"Med rec complete per pt and a call to [facility] in [place]   Allergies reviewed  No ABX in last month  Pt states she has not taken her medications in \"a few weeks\"  Called pharmacy to verify pt's previous prescriptions  Pt has not filled anything since 5/31/17 for 90 day supplies  At this time, pt filled:  Lantus  Novolog  Metoprolol 25mg BID  Prilosec 20mg QD  Lipitor 40mg QD  Levothyroxine 50mcg QD  Gabapentin 300mg BID  Singulair 10mg QD  Spiriva QD  "

## 2017-11-29 NOTE — ED NOTES
Chief Complaint   Patient presents with   • Shortness of Breath     Pt is transfer from [facility] for higher level of care. Recieved report from flight crew.  Pt with a light non productive cough, RR 24.  pt states symptoms for last couple of days worsening here to r/o pulmonary edema.

## 2017-11-29 NOTE — CARE PLAN
Problem: Respiratory:  Goal: Respiratory status will improve  Outcome: PROGRESSING AS EXPECTED  COPD coordinator contacted, stated pt will be seen this AM    Problem: Communication  Goal: The ability to communicate needs accurately and effectively will improve  Outcome: PROGRESSING AS EXPECTED  Patient agitated that everyone asks her the same questions and won't let her sleep. States she doesn't understand why she has to keep answering the same thing over and over. Educated patient on necessity to assess her needs and wants and status throughout the day.     Problem: Safety  Goal: Will remain free from injury  Outcome: PROGRESSING AS EXPECTED  Will call for help when needing to get OOB    Problem: Psychosocial Needs:  Goal: Level of anxiety will decrease  Outcome: PROGRESSING AS EXPECTED  Discussed with patient POC for day, meds given, pain treated, patient now asleep after eating breakfast.

## 2017-11-29 NOTE — CARE PLAN
Problem: Oxygenation:  Goal: Maintain adequate oxygenation dependent on patient condition  Outcome: PROGRESSING AS EXPECTED    Intervention: Manage oxygen therapy by monitoring pulse oximetry and/or ABG values  1 LPM Nc, please titrate pt as indicated, pt home baseline at 4LPM at night only      Problem: Bronchoconstriction:  Goal: Improve in air movement and diminished wheezing  Outcome: PROGRESSING AS EXPECTED    Intervention: Implement inhaled treatments  DUO Q4 copd exacerbation

## 2017-11-29 NOTE — PROGRESS NOTES
Geovanna Watkins Fall Risk Assessment:     Last Known Fall: No falls  Mobility: Immobilized/requires assist of one person  Medications: Cardiovascular or central nervous system meds  Mental Status/LOC/Awareness: Awake, alert, and oriented to date, place, and person  Toileting Needs: No needs  Volume/Electrolyte Status: No problems  Communication/Sensory: Visual (Glasses)/hearing deficit  Behavior: Depression/anxiety  Geovanna Watkins Fall Risk Total: 7  Fall Risk Level: LOW RISK    Universal Fall Precautions:  call light/belongings in reach, bed in low position and locked, wheelchairs and assistive devices out of sight, siderails up x 2, use non-slip footwear, adequate lighting, clutter free and spill free environment, educate on level of risk, educate to call for assistance    Fall Risk Level Interventions:   TRIAL (TELE 8, NEURO, MED ERMELINDA 5) Low Fall Risk Interventions  Place yellow fall risk ID band on patient: completed  Provide patient/family education based on risk assessment: completed  Educate patient/family to call staff for assistance when getting out of bed: completed  Place fall precaution signage outside patient door: completed      Patient Specific Interventions:     Medication: review medications with patient and family  Mental Status/LOC/Awareness: reinforce falls education, check on patient hourly and reinforce the use of call light  Toileting: provide frquent toileting, instruct patient/family on the use of grab bars and instruct patient/family on the need to call for assistance when toileting  Volume/Electrolyte Status: advance diet as tolerated  Communication/Sensory: update plan of care on whiteboard  Behavioral: use appropriate de-escalation techniques  Mobility: schedule physical activity throughout the day, provide comfort measures during transport, dangle prior to standing, utilize bed/chair fall alarm, ensure bed is locked and in lowest position and provide appropriate assistive device

## 2017-11-30 PROBLEM — D64.9 ANEMIA: Status: ACTIVE | Noted: 2017-11-30

## 2017-11-30 PROBLEM — D72.829 LEUKOCYTOSIS: Status: ACTIVE | Noted: 2017-11-30

## 2017-11-30 PROBLEM — R07.81 PLEURITIC CHEST PAIN: Status: ACTIVE | Noted: 2017-11-30

## 2017-11-30 LAB
ALBUMIN SERPL BCP-MCNC: 3.4 G/DL (ref 3.2–4.9)
ALBUMIN/GLOB SERPL: 1.2 G/DL
ALP SERPL-CCNC: 112 U/L (ref 30–99)
ALT SERPL-CCNC: 10 U/L (ref 2–50)
ANION GAP SERPL CALC-SCNC: 5 MMOL/L (ref 0–11.9)
AST SERPL-CCNC: 8 U/L (ref 12–45)
BASOPHILS # BLD AUTO: 0.2 % (ref 0–1.8)
BASOPHILS # BLD: 0.04 K/UL (ref 0–0.12)
BILIRUB SERPL-MCNC: 0.3 MG/DL (ref 0.1–1.5)
BUN SERPL-MCNC: 24 MG/DL (ref 8–22)
CALCIUM SERPL-MCNC: 10 MG/DL (ref 8.5–10.5)
CHLORIDE SERPL-SCNC: 104 MMOL/L (ref 96–112)
CO2 SERPL-SCNC: 23 MMOL/L (ref 20–33)
CREAT SERPL-MCNC: 0.76 MG/DL (ref 0.5–1.4)
EOSINOPHIL # BLD AUTO: 0 K/UL (ref 0–0.51)
EOSINOPHIL NFR BLD: 0 % (ref 0–6.9)
ERYTHROCYTE [DISTWIDTH] IN BLOOD BY AUTOMATED COUNT: 42.5 FL (ref 35.9–50)
EST. AVERAGE GLUCOSE BLD GHB EST-MCNC: 197 MG/DL
GFR SERPL CREATININE-BSD FRML MDRD: >60 ML/MIN/1.73 M 2
GLOBULIN SER CALC-MCNC: 2.9 G/DL (ref 1.9–3.5)
GLUCOSE BLD-MCNC: 185 MG/DL (ref 65–99)
GLUCOSE BLD-MCNC: 242 MG/DL (ref 65–99)
GLUCOSE BLD-MCNC: 254 MG/DL (ref 65–99)
GLUCOSE BLD-MCNC: 263 MG/DL (ref 65–99)
GLUCOSE SERPL-MCNC: 214 MG/DL (ref 65–99)
HBA1C MFR BLD: 8.5 % (ref 0–5.6)
HCT VFR BLD AUTO: 35.3 % (ref 37–47)
HGB BLD-MCNC: 11.6 G/DL (ref 12–16)
IMM GRANULOCYTES # BLD AUTO: 0.13 K/UL (ref 0–0.11)
IMM GRANULOCYTES NFR BLD AUTO: 0.7 % (ref 0–0.9)
LYMPHOCYTES # BLD AUTO: 0.99 K/UL (ref 1–4.8)
LYMPHOCYTES NFR BLD: 5.4 % (ref 22–41)
MCH RBC QN AUTO: 29.4 PG (ref 27–33)
MCHC RBC AUTO-ENTMCNC: 32.9 G/DL (ref 33.6–35)
MCV RBC AUTO: 89.4 FL (ref 81.4–97.8)
MONOCYTES # BLD AUTO: 0.43 K/UL (ref 0–0.85)
MONOCYTES NFR BLD AUTO: 2.3 % (ref 0–13.4)
NEUTROPHILS # BLD AUTO: 16.78 K/UL (ref 2–7.15)
NEUTROPHILS NFR BLD: 91.4 % (ref 44–72)
NRBC # BLD AUTO: 0 K/UL
NRBC BLD AUTO-RTO: 0 /100 WBC
PLATELET # BLD AUTO: 331 K/UL (ref 164–446)
PMV BLD AUTO: 10.6 FL (ref 9–12.9)
POTASSIUM SERPL-SCNC: 4.6 MMOL/L (ref 3.6–5.5)
PROCALCITONIN SERPL-MCNC: <0.05 NG/ML
PROT SERPL-MCNC: 6.3 G/DL (ref 6–8.2)
RBC # BLD AUTO: 3.95 M/UL (ref 4.2–5.4)
SODIUM SERPL-SCNC: 132 MMOL/L (ref 135–145)
WBC # BLD AUTO: 18.4 K/UL (ref 4.8–10.8)

## 2017-11-30 PROCEDURE — 82962 GLUCOSE BLOOD TEST: CPT | Mod: 91

## 2017-11-30 PROCEDURE — 84145 PROCALCITONIN (PCT): CPT

## 2017-11-30 PROCEDURE — 94640 AIRWAY INHALATION TREATMENT: CPT

## 2017-11-30 PROCEDURE — 80053 COMPREHEN METABOLIC PANEL: CPT

## 2017-11-30 PROCEDURE — 3E0234Z INTRODUCTION OF SERUM, TOXOID AND VACCINE INTO MUSCLE, PERCUTANEOUS APPROACH: ICD-10-PCS | Performed by: HOSPITALIST

## 2017-11-30 PROCEDURE — 700111 HCHG RX REV CODE 636 W/ 250 OVERRIDE (IP): Performed by: HOSPITALIST

## 2017-11-30 PROCEDURE — 700102 HCHG RX REV CODE 250 W/ 637 OVERRIDE(OP): Performed by: HOSPITALIST

## 2017-11-30 PROCEDURE — 700101 HCHG RX REV CODE 250: Performed by: HOSPITALIST

## 2017-11-30 PROCEDURE — 90471 IMMUNIZATION ADMIN: CPT

## 2017-11-30 PROCEDURE — A9270 NON-COVERED ITEM OR SERVICE: HCPCS | Performed by: HOSPITALIST

## 2017-11-30 PROCEDURE — 36415 COLL VENOUS BLD VENIPUNCTURE: CPT

## 2017-11-30 PROCEDURE — 770006 HCHG ROOM/CARE - MED/SURG/GYN SEMI*

## 2017-11-30 PROCEDURE — 90686 IIV4 VACC NO PRSV 0.5 ML IM: CPT | Performed by: HOSPITALIST

## 2017-11-30 PROCEDURE — 85025 COMPLETE CBC W/AUTO DIFF WBC: CPT

## 2017-11-30 PROCEDURE — 90732 PPSV23 VACC 2 YRS+ SUBQ/IM: CPT | Performed by: HOSPITALIST

## 2017-11-30 PROCEDURE — 99233 SBSQ HOSP IP/OBS HIGH 50: CPT | Performed by: HOSPITALIST

## 2017-11-30 RX ORDER — PREDNISONE 20 MG/1
20 TABLET ORAL 2 TIMES DAILY
Status: DISCONTINUED | OUTPATIENT
Start: 2017-11-30 | End: 2017-12-02 | Stop reason: HOSPADM

## 2017-11-30 RX ORDER — IPRATROPIUM BROMIDE AND ALBUTEROL SULFATE 2.5; .5 MG/3ML; MG/3ML
3 SOLUTION RESPIRATORY (INHALATION)
Status: DISCONTINUED | OUTPATIENT
Start: 2017-11-30 | End: 2017-12-02 | Stop reason: HOSPADM

## 2017-11-30 RX ADMIN — OXYCODONE HYDROCHLORIDE 5 MG: 5 TABLET ORAL at 08:40

## 2017-11-30 RX ADMIN — BUPROPION HYDROCHLORIDE 150 MG: 150 TABLET, EXTENDED RELEASE ORAL at 21:33

## 2017-11-30 RX ADMIN — BUPROPION HYDROCHLORIDE 150 MG: 150 TABLET, EXTENDED RELEASE ORAL at 08:23

## 2017-11-30 RX ADMIN — INSULIN GLARGINE 30 UNITS: 100 INJECTION, SOLUTION SUBCUTANEOUS at 21:34

## 2017-11-30 RX ADMIN — PNEUMOCOCCAL VACCINE POLYVALENT 25 MCG
25; 25; 25; 25; 25; 25; 25; 25; 25; 25; 25; 25; 25; 25; 25; 25; 25; 25; 25; 25; 25; 25; 25 INJECTION, SOLUTION INTRAMUSCULAR; SUBCUTANEOUS at 06:15

## 2017-11-30 RX ADMIN — NICOTINE 14 MG: 14 PATCH, EXTENDED RELEASE TRANSDERMAL at 06:14

## 2017-11-30 RX ADMIN — IPRATROPIUM BROMIDE AND ALBUTEROL SULFATE 3 ML: .5; 3 SOLUTION RESPIRATORY (INHALATION) at 11:46

## 2017-11-30 RX ADMIN — INSULIN HUMAN 7 UNITS: 100 INJECTION, SOLUTION PARENTERAL at 11:59

## 2017-11-30 RX ADMIN — GABAPENTIN 300 MG: 300 CAPSULE ORAL at 14:11

## 2017-11-30 RX ADMIN — HEPARIN SODIUM 5000 UNITS: 5000 INJECTION, SOLUTION INTRAVENOUS; SUBCUTANEOUS at 06:14

## 2017-11-30 RX ADMIN — FLUTICASONE PROPIONATE 50 MCG: 50 SPRAY, METERED NASAL at 21:33

## 2017-11-30 RX ADMIN — OMEPRAZOLE 20 MG: 20 CAPSULE, DELAYED RELEASE ORAL at 08:23

## 2017-11-30 RX ADMIN — LEVOTHYROXINE SODIUM 50 MCG: 50 TABLET ORAL at 06:27

## 2017-11-30 RX ADMIN — PREDNISONE 20 MG: 20 TABLET ORAL at 17:06

## 2017-11-30 RX ADMIN — INSULIN GLARGINE 30 UNITS: 100 INJECTION, SOLUTION SUBCUTANEOUS at 08:19

## 2017-11-30 RX ADMIN — IPRATROPIUM BROMIDE AND ALBUTEROL SULFATE 3 ML: .5; 3 SOLUTION RESPIRATORY (INHALATION) at 14:51

## 2017-11-30 RX ADMIN — OXYCODONE HYDROCHLORIDE 5 MG: 5 TABLET ORAL at 03:25

## 2017-11-30 RX ADMIN — TIOTROPIUM BROMIDE 1 CAPSULE: 18 CAPSULE ORAL; RESPIRATORY (INHALATION) at 08:22

## 2017-11-30 RX ADMIN — HEPARIN SODIUM 5000 UNITS: 5000 INJECTION, SOLUTION INTRAVENOUS; SUBCUTANEOUS at 14:11

## 2017-11-30 RX ADMIN — IPRATROPIUM BROMIDE AND ALBUTEROL SULFATE 3 ML: .5; 3 SOLUTION RESPIRATORY (INHALATION) at 08:25

## 2017-11-30 RX ADMIN — METOPROLOL TARTRATE 25 MG: 25 TABLET, FILM COATED ORAL at 21:32

## 2017-11-30 RX ADMIN — FLUTICASONE PROPIONATE 50 MCG: 50 SPRAY, METERED NASAL at 08:23

## 2017-11-30 RX ADMIN — METOPROLOL TARTRATE 25 MG: 25 TABLET, FILM COATED ORAL at 08:23

## 2017-11-30 RX ADMIN — INSULIN HUMAN 4 UNITS: 100 INJECTION, SOLUTION PARENTERAL at 21:33

## 2017-11-30 RX ADMIN — INSULIN HUMAN 3 UNITS: 100 INJECTION, SOLUTION PARENTERAL at 17:05

## 2017-11-30 RX ADMIN — STANDARDIZED SENNA CONCENTRATE AND DOCUSATE SODIUM 2 TABLET: 8.6; 5 TABLET, FILM COATED ORAL at 08:23

## 2017-11-30 RX ADMIN — METHYLPREDNISOLONE SODIUM SUCCINATE 40 MG: 40 INJECTION, POWDER, FOR SOLUTION INTRAMUSCULAR; INTRAVENOUS at 06:14

## 2017-11-30 RX ADMIN — AZITHROMYCIN 250 MG: 250 TABLET, FILM COATED ORAL at 08:23

## 2017-11-30 RX ADMIN — STANDARDIZED SENNA CONCENTRATE AND DOCUSATE SODIUM 2 TABLET: 8.6; 5 TABLET, FILM COATED ORAL at 21:33

## 2017-11-30 RX ADMIN — INSULIN HUMAN 7 UNITS: 100 INJECTION, SOLUTION PARENTERAL at 06:22

## 2017-11-30 RX ADMIN — IPRATROPIUM BROMIDE AND ALBUTEROL SULFATE 3 ML: .5; 3 SOLUTION RESPIRATORY (INHALATION) at 19:53

## 2017-11-30 RX ADMIN — CYCLOBENZAPRINE 10 MG: 10 TABLET, FILM COATED ORAL at 21:35

## 2017-11-30 RX ADMIN — INFLUENZA A VIRUS A/MICHIGAN/45/2015 X-275 (H1N1) ANTIGEN (FORMALDEHYDE INACTIVATED), INFLUENZA A VIRUS A/HONG KONG/4801/2014 X-263B (H3N2) ANTIGEN (FORMALDEHYDE INACTIVATED), INFLUENZA B VIRUS B/PHUKET/3073/2013 ANTIGEN (FORMALDEHYDE INACTIVATED), AND INFLUENZA B VIRUS B/BRISBANE/60/2008 ANTIGEN (FORMALDEHYDE INACTIVATED) 0.5 ML: 15; 15; 15; 15 INJECTION, SUSPENSION INTRAMUSCULAR at 06:14

## 2017-11-30 RX ADMIN — GABAPENTIN 300 MG: 300 CAPSULE ORAL at 08:22

## 2017-11-30 RX ADMIN — ESCITALOPRAM OXALATE 20 MG: 10 TABLET ORAL at 08:23

## 2017-11-30 RX ADMIN — LOSARTAN POTASSIUM 50 MG: 50 TABLET, FILM COATED ORAL at 08:23

## 2017-11-30 RX ADMIN — GABAPENTIN 300 MG: 300 CAPSULE ORAL at 21:32

## 2017-11-30 RX ADMIN — HEPARIN SODIUM 5000 UNITS: 5000 INJECTION, SOLUTION INTRAVENOUS; SUBCUTANEOUS at 21:33

## 2017-11-30 ASSESSMENT — PAIN SCALES - GENERAL
PAINLEVEL_OUTOF10: 7
PAINLEVEL_OUTOF10: 6
PAINLEVEL_OUTOF10: 4
PAINLEVEL_OUTOF10: 6
PAINLEVEL_OUTOF10: 4
PAINLEVEL_OUTOF10: 0

## 2017-11-30 ASSESSMENT — ENCOUNTER SYMPTOMS
SHORTNESS OF BREATH: 1
SPUTUM PRODUCTION: 1
WHEEZING: 1
COUGH: 1

## 2017-11-30 NOTE — PROGRESS NOTES
Assumed care of pt. Able to make needs known. A/ox4. 3L O2 via NC,  on. Diabetic diet. Accu checks ACHS. Ambulates to bathroom, SBA. Bed frame on. RT protocol in place. POC home when stable. Call light in reach, bed in low position, will continue hourly rounding.

## 2017-11-30 NOTE — DISCHARGE PLANNING
Medical Social Worker    VINCENZO got CHOICE for [facility] in [place], [place]. VINCENZO faxed CHOICE to [facility] to resume services for pt.

## 2017-11-30 NOTE — ASSESSMENT & PLAN NOTE
With deep breaths and cough  No ischemic changes on ekg  Trop negative  Consistent with copd exacerbation and bronchitis  Resolving

## 2017-11-30 NOTE — PROGRESS NOTES
Geovanna Watkins Fall Risk Assessment:     Last Known Fall: No falls  Mobility: Immobilized/requires assist of one person  Medications: Cardiovascular or central nervous system meds  Mental Status/LOC/Awareness: Awake, alert, and oriented to date, place, and person  Toileting Needs: No needs  Volume/Electrolyte Status: No problems  Communication/Sensory: Visual (Glasses)/hearing deficit  Behavior: Appropriate behavior  Geovanna Watkins Fall Risk Total: 6  Fall Risk Level: NO RISK    Universal Fall Precautions:  call light/belongings in reach, bed in low position and locked, wheelchairs and assistive devices out of sight, siderails up x 2, use non-slip footwear, adequate lighting, clutter free and spill free environment, educate on level of risk, educate to call for assistance    Fall Risk Level Interventions:   TRIAL (TELE 8, NEURO, MED ERMELINDA 5) Low Fall Risk Interventions  Place yellow fall risk ID band on patient: completed  Provide patient/family education based on risk assessment: completed  Educate patient/family to call staff for assistance when getting out of bed: completed  Place fall precaution signage outside patient door: completed      Patient Specific Interventions:     Medication: review medications with patient and family  Mental Status/LOC/Awareness: reinforce falls education, check on patient hourly, utilize bed/chair fall alarm and reinforce the use of call light  Toileting: provide frquent toileting  Volume/Electrolyte Status: monitor blood sugars and maintain appropriate blood sugar levels if diabetic and monitor abnormal lab values  Communication/Sensory: update plan of care on whiteboard  Behavioral: instruct/reinforce fall program rationale  Mobility: dangle prior to standing, utilize bed/chair fall alarm and ensure bed is locked and in lowest position

## 2017-11-30 NOTE — CARE PLAN
Problem: Bronchoconstriction:  Goal: Improve in air movement and diminished wheezing  Outcome: PROGRESSING SLOWER THAN EXPECTED  Respiratory Therapy Update    Interdisciplinary Plan of Care-Goals (Indications)  Obstructive Ventilatory Defect or Pulmonary Disease without Obvious Obstruction: History / Diagnosis (11/29/17 2015)  Interdisciplinary Plan of Care-Outcomes   Bronchodilator Outcome: Diminished Wheezing and Volume of Air Movement Increased (11/29/17 2015)    #SVN Performed: Yes (11/29/17 2015)       O2 (LPM): 2 (11/29/17 2015)  O2 Daily Delivery Respiratory : Silicone Nasal Cannula (11/29/17 2015)    Breath Sounds  Pre/Post Intervention: Pre Intervention Assessment (11/29/17 2015)  RUL Breath Sounds: Expiratory Wheezes (11/29/17 2015)  RML Breath Sounds: Expiratory Wheezes (11/29/17 2015)  RLL Breath Sounds: Diminished (11/29/17 2015)  RADHA Breath Sounds: Expiratory Wheezes (11/29/17 2015)  LLL Breath Sounds: Diminished (11/29/17 2015)

## 2017-11-30 NOTE — ASSESSMENT & PLAN NOTE
Clinically improved  Suspect secondary bronchitis  Continue respiratory treatments  YANES much improved, clinically back near baseline  Exam improved  Home oxygen set up  Chronic hypoxemic respiratory failure at baseline  Will d/c on oral abx and steroid taper

## 2017-11-30 NOTE — ASSESSMENT & PLAN NOTE
Cessation recommended Ready to quit: Not Answered  Counseling given: Not Answered  Less than 10 minutes  Cessation strategies discussed

## 2017-11-30 NOTE — CARE PLAN
Problem: Bronchoconstriction:  Goal: Improve in air movement and diminished wheezing  Outcome: PROGRESSING AS EXPECTED  Pt on QID therapy

## 2017-11-30 NOTE — PROGRESS NOTES
[facility] Hospitalist Progress Note    Date of Service: 2017    Chief Complaint  Patient is a 57 y.o. Female with history of chronic hypoxemic respiratory failure, on 3 L of oxygen at baseline who was admitted 2017 with sob and copd excacerbation    Interval Problem Update  Feeling much better today, sob improving, cough improving, pleuritic pain improving    Consultants/Specialty  None    Disposition  Continue treatment, change to oral steroids        Review of Systems   Respiratory: Positive for cough, sputum production, shortness of breath and wheezing.    Cardiovascular: Positive for chest pain (pleuritic, stable, present x 1 week).      Physical Exam  Laboratory/Imaging   Hemodynamics  Temp (24hrs), Av.4 °C (97.6 °F), Min:36.2 °C (97.2 °F), Max:36.7 °C (98 °F)   Temperature: 36.6 °C (97.8 °F)  Pulse  Av.2  Min: 53  Max: 112   Blood Pressure: 121/83      Respiratory      Respiration: 16, Pulse Oximetry: 94 %, O2 Daily Delivery Respiratory : Silicone Nasal Cannula     Given By:: Mouthpiece  RUL Breath Sounds: Clear;Diminished, RML Breath Sounds: Clear;Diminished, RLL Breath Sounds: Diminished, RADHA Breath Sounds: Clear;Diminished, LLL Breath Sounds: Diminished    Fluids  No intake or output data in the 24 hours ending 17 1146    Nutrition  Orders Placed This Encounter   Procedures   • Diet Order     Standing Status:   Standing     Number of Occurrences:   1     Order Specific Question:   Diet:     Answer:   Diabetic [3]     Physical Exam   Constitutional: She is oriented to person, place, and time. She appears well-developed and well-nourished. No distress.   HENT:   Head: Normocephalic and atraumatic.   Mouth/Throat: Oropharynx is clear and moist. No oropharyngeal exudate.   Eyes: Conjunctivae and EOM are normal. Pupils are equal, round, and reactive to light. Right eye exhibits no discharge. Left eye exhibits no discharge. No scleral icterus.   Neck: Normal range of motion. Neck supple.  No tracheal deviation present. No thyromegaly present.   Cardiovascular: Normal rate, normal heart sounds and intact distal pulses.  Exam reveals no gallop and no friction rub.    No murmur heard.  Pulmonary/Chest: No stridor. No respiratory distress. She has wheezes (with upper airway rhonchi, bilaterally). She has no rales. She exhibits no tenderness.   Abdominal: Soft. Bowel sounds are normal. She exhibits no distension and no mass. There is no tenderness. There is no rebound and no guarding.   Musculoskeletal: Normal range of motion. She exhibits no edema or tenderness.   Lymphadenopathy:     She has no cervical adenopathy.   Neurological: She is alert and oriented to person, place, and time. She has normal reflexes. No cranial nerve deficit. She exhibits normal muscle tone. Coordination normal.   Skin: Skin is warm and dry. No rash noted. She is not diaphoretic. No erythema. No pallor.   Psychiatric: She has a normal mood and affect. Her behavior is normal. Judgment and thought content normal.   Nursing note and vitals reviewed.      Recent Labs      11/28/17 2237 11/29/17 0338  11/30/17   0347   WBC  8.9  7.4  18.4*   RBC  3.87*  4.04*  3.95*   HEMOGLOBIN  11.3*  11.8*  11.6*   HEMATOCRIT  34.1*  35.1*  35.3*   MCV  88.1  86.9  89.4   MCH  29.2  29.2  29.4   MCHC  33.1*  33.6  32.9*   RDW  41.7  41.1  42.5   PLATELETCT  268  285  331   MPV  10.8  10.4  10.6     Recent Labs      11/28/17 2237 11/29/17   0929  11/30/17   0347   SODIUM  135  136  132*   POTASSIUM  3.6  4.5  4.6   CHLORIDE  101  104  104   CO2  19*  24  23   GLUCOSE  420*  285*  214*   BUN  12  16  24*   CREATININE  0.77  0.59  0.76   CALCIUM  9.5  9.5  10.0         Recent Labs      11/28/17 2237   BNPBTYPENAT  31              Assessment/Plan     Anemia   Assessment & Plan    Suspect due to chronic disease  Stable  Following          Leukocytosis   Assessment & Plan    LIkely multifactorial  Suspect bronchitis contributing  Steroids  contributing  Will check procalcitonin  Following        COPD exacerbation (CMS-[facility])   Assessment & Plan    Clinically improving today  Suspect secondary bronchitis  Continue respiratory treatments  Will change steroids to oral  Continue nebulizer treatments        Tobacco dependence   Assessment & Plan    Cessation recommended  Cessation strategies discussed          Quality-Core Measures

## 2017-11-30 NOTE — PROGRESS NOTES
Geovanna Watkins Fall Risk Assessment:     Last Known Fall: No falls  Mobility: Immobilized/requires assist of one person  Medications: Cardiovascular or central nervous system meds  Mental Status/LOC/Awareness: Awake, alert, and oriented to date, place, and person  Toileting Needs: No needs  Volume/Electrolyte Status: No problems  Communication/Sensory: Visual (Glasses)/hearing deficit  Behavior: Appropriate behavior  Geovanna Watkisn Fall Risk Total: 6  Fall Risk Level: NO RISK    Universal Fall Precautions:  call light/belongings in reach, bed in low position and locked, wheelchairs and assistive devices out of sight, siderails up x 2, use non-slip footwear, adequate lighting, clutter free and spill free environment, educate on level of risk, educate to call for assistance    Fall Risk Level Interventions:   TRIAL (TELE 8, NEURO, MED ERMELINDA 5) Low Fall Risk Interventions  Place yellow fall risk ID band on patient: completed  Provide patient/family education based on risk assessment: completed  Educate patient/family to call staff for assistance when getting out of bed: completed  Place fall precaution signage outside patient door: verified      Patient Specific Interventions:     Medication: review medications with patient and family  Mental Status/LOC/Awareness: reinforce falls education, check on patient hourly, utilize bed/chair fall alarm and reinforce the use of call light  Toileting: provide frquent toileting  Volume/Electrolyte Status: ensure patient remains hydrated, monitor blood sugars and maintain appropriate blood sugar levels if diabetic and monitor abnormal lab values  Communication/Sensory: update plan of care on whiteboard  Behavioral: not applicable  Mobility: dangle prior to standing, utilize bed/chair fall alarm and ensure bed is locked and in lowest position

## 2017-11-30 NOTE — CARE PLAN
Problem: Pain Management  Goal: Pain level will decrease to patient's comfort goal    Intervention: Follow pain managment plan developed in collaboration with patient and Interdisciplinary Team  Complains of left rib pain, medicated per MAR      Problem: Communication  Goal: The ability to communicate needs accurately and effectively will improve    Intervention: [place] patient and significant other/support system to call light to alert staff of needs  Able to make needs known, instructed to call for assistance

## 2017-11-30 NOTE — CARE PLAN
Problem: Respiratory:  Goal: Respiratory status will improve  Pt oxygen titrated as needed.  in use to monitor oxygenation     Problem: Venous Thromboembolism (VTW)/Deep Vein Thrombosis (DVT) Prevention:  Goal: Patient will participate in Venous Thrombosis (VTE)/Deep Vein Thrombosis (DVT)Prevention Measures  Chemical prophylaxis is in use

## 2017-11-30 NOTE — FACE TO FACE
Face to Face Note  -  Durable Medical Equipment    Tona Max M.D. - NPI: [id number]  I certify that this patient is under my care and that they had a durable medical equipment(DME)face to face encounter by myself that meets the physician DME face-to-face encounter requirements with this patient on:    Date of encounter:   Patient:                    MRN:                       YOB: 2017  Joel Day  [id number]  1960     The encounter with the patient was in whole, or in part, for the following medical condition, which is the primary reason for durable medical equipment:  COPD    I certify that, based on my findings, the following durable medical equipment is medically necessary:  Oxygen.    HOME O2 Saturation Measurements:(Values must be present for Home Oxygen orders)         ,  3 L of oxygen continuous by NC   ,         My Clinical findings support the need for the above equipment due to:  Hypoxia    Supporting Symptoms: desaturation on room air

## 2017-11-30 NOTE — PROGRESS NOTES
Pt is A+Ox4. HEIN. Denies n/t or n/v. C/o r rib pain. Pt fluctuating between 2-5 LPM via NC. RT involved to provide breathing treatments as necessary.  used to monitor oxygenation. All questions answered regarding POC.

## 2017-12-01 LAB
ANION GAP SERPL CALC-SCNC: 8 MMOL/L (ref 0–11.9)
BASOPHILS # BLD AUTO: 0.1 % (ref 0–1.8)
BASOPHILS # BLD: 0.02 K/UL (ref 0–0.12)
BUN SERPL-MCNC: 28 MG/DL (ref 8–22)
CALCIUM SERPL-MCNC: 10 MG/DL (ref 8.5–10.5)
CHLORIDE SERPL-SCNC: 105 MMOL/L (ref 96–112)
CO2 SERPL-SCNC: 23 MMOL/L (ref 20–33)
CREAT SERPL-MCNC: 0.88 MG/DL (ref 0.5–1.4)
EOSINOPHIL # BLD AUTO: 0 K/UL (ref 0–0.51)
EOSINOPHIL NFR BLD: 0 % (ref 0–6.9)
ERYTHROCYTE [DISTWIDTH] IN BLOOD BY AUTOMATED COUNT: 43.1 FL (ref 35.9–50)
GFR SERPL CREATININE-BSD FRML MDRD: >60 ML/MIN/1.73 M 2
GLUCOSE BLD-MCNC: 180 MG/DL (ref 65–99)
GLUCOSE BLD-MCNC: 181 MG/DL (ref 65–99)
GLUCOSE BLD-MCNC: 188 MG/DL (ref 65–99)
GLUCOSE BLD-MCNC: 79 MG/DL (ref 65–99)
GLUCOSE SERPL-MCNC: 196 MG/DL (ref 65–99)
HCT VFR BLD AUTO: 35.6 % (ref 37–47)
HGB BLD-MCNC: 11.7 G/DL (ref 12–16)
IMM GRANULOCYTES # BLD AUTO: 0.18 K/UL (ref 0–0.11)
IMM GRANULOCYTES NFR BLD AUTO: 1.3 % (ref 0–0.9)
LYMPHOCYTES # BLD AUTO: 1.45 K/UL (ref 1–4.8)
LYMPHOCYTES NFR BLD: 10.7 % (ref 22–41)
MCH RBC QN AUTO: 29.5 PG (ref 27–33)
MCHC RBC AUTO-ENTMCNC: 32.9 G/DL (ref 33.6–35)
MCV RBC AUTO: 89.9 FL (ref 81.4–97.8)
MONOCYTES # BLD AUTO: 0.82 K/UL (ref 0–0.85)
MONOCYTES NFR BLD AUTO: 6.1 % (ref 0–13.4)
NEUTROPHILS # BLD AUTO: 11.03 K/UL (ref 2–7.15)
NEUTROPHILS NFR BLD: 81.8 % (ref 44–72)
NRBC # BLD AUTO: 0 K/UL
NRBC BLD AUTO-RTO: 0 /100 WBC
PLATELET # BLD AUTO: 335 K/UL (ref 164–446)
PMV BLD AUTO: 10.7 FL (ref 9–12.9)
POTASSIUM SERPL-SCNC: 4.2 MMOL/L (ref 3.6–5.5)
RBC # BLD AUTO: 3.96 M/UL (ref 4.2–5.4)
SODIUM SERPL-SCNC: 136 MMOL/L (ref 135–145)
WBC # BLD AUTO: 13.5 K/UL (ref 4.8–10.8)

## 2017-12-01 PROCEDURE — 94640 AIRWAY INHALATION TREATMENT: CPT

## 2017-12-01 PROCEDURE — 82962 GLUCOSE BLOOD TEST: CPT | Mod: 91

## 2017-12-01 PROCEDURE — 36415 COLL VENOUS BLD VENIPUNCTURE: CPT

## 2017-12-01 PROCEDURE — 700111 HCHG RX REV CODE 636 W/ 250 OVERRIDE (IP): Performed by: HOSPITALIST

## 2017-12-01 PROCEDURE — 80048 BASIC METABOLIC PNL TOTAL CA: CPT

## 2017-12-01 PROCEDURE — 85025 COMPLETE CBC W/AUTO DIFF WBC: CPT

## 2017-12-01 PROCEDURE — 770006 HCHG ROOM/CARE - MED/SURG/GYN SEMI*

## 2017-12-01 PROCEDURE — 700102 HCHG RX REV CODE 250 W/ 637 OVERRIDE(OP): Performed by: HOSPITALIST

## 2017-12-01 PROCEDURE — A9270 NON-COVERED ITEM OR SERVICE: HCPCS | Performed by: HOSPITALIST

## 2017-12-01 PROCEDURE — 99233 SBSQ HOSP IP/OBS HIGH 50: CPT | Performed by: HOSPITALIST

## 2017-12-01 PROCEDURE — 700101 HCHG RX REV CODE 250: Performed by: HOSPITALIST

## 2017-12-01 RX ADMIN — IPRATROPIUM BROMIDE AND ALBUTEROL SULFATE 3 ML: .5; 3 SOLUTION RESPIRATORY (INHALATION) at 07:34

## 2017-12-01 RX ADMIN — AZITHROMYCIN 250 MG: 250 TABLET, FILM COATED ORAL at 08:05

## 2017-12-01 RX ADMIN — FLUTICASONE PROPIONATE 50 MCG: 50 SPRAY, METERED NASAL at 08:05

## 2017-12-01 RX ADMIN — NICOTINE 14 MG: 14 PATCH, EXTENDED RELEASE TRANSDERMAL at 06:21

## 2017-12-01 RX ADMIN — GABAPENTIN 300 MG: 300 CAPSULE ORAL at 16:11

## 2017-12-01 RX ADMIN — IPRATROPIUM BROMIDE AND ALBUTEROL SULFATE 3 ML: .5; 3 SOLUTION RESPIRATORY (INHALATION) at 14:46

## 2017-12-01 RX ADMIN — METOPROLOL TARTRATE 25 MG: 25 TABLET, FILM COATED ORAL at 21:04

## 2017-12-01 RX ADMIN — OXYCODONE HYDROCHLORIDE 5 MG: 5 TABLET ORAL at 16:14

## 2017-12-01 RX ADMIN — OXYCODONE HYDROCHLORIDE 5 MG: 5 TABLET ORAL at 08:11

## 2017-12-01 RX ADMIN — IPRATROPIUM BROMIDE AND ALBUTEROL SULFATE 3 ML: .5; 3 SOLUTION RESPIRATORY (INHALATION) at 11:40

## 2017-12-01 RX ADMIN — BUPROPION HYDROCHLORIDE 150 MG: 150 TABLET, EXTENDED RELEASE ORAL at 08:05

## 2017-12-01 RX ADMIN — INSULIN GLARGINE 30 UNITS: 100 INJECTION, SOLUTION SUBCUTANEOUS at 21:05

## 2017-12-01 RX ADMIN — HEPARIN SODIUM 5000 UNITS: 5000 INJECTION, SOLUTION INTRAVENOUS; SUBCUTANEOUS at 06:21

## 2017-12-01 RX ADMIN — LEVOTHYROXINE SODIUM 50 MCG: 50 TABLET ORAL at 06:21

## 2017-12-01 RX ADMIN — BUPROPION HYDROCHLORIDE 150 MG: 150 TABLET, EXTENDED RELEASE ORAL at 21:06

## 2017-12-01 RX ADMIN — GABAPENTIN 300 MG: 300 CAPSULE ORAL at 21:02

## 2017-12-01 RX ADMIN — GABAPENTIN 300 MG: 300 CAPSULE ORAL at 08:05

## 2017-12-01 RX ADMIN — ESCITALOPRAM OXALATE 20 MG: 10 TABLET ORAL at 08:05

## 2017-12-01 RX ADMIN — INSULIN HUMAN 3 UNITS: 100 INJECTION, SOLUTION PARENTERAL at 21:04

## 2017-12-01 RX ADMIN — PREDNISONE 20 MG: 20 TABLET ORAL at 08:05

## 2017-12-01 RX ADMIN — INSULIN HUMAN 3 UNITS: 100 INJECTION, SOLUTION PARENTERAL at 11:36

## 2017-12-01 RX ADMIN — HEPARIN SODIUM 5000 UNITS: 5000 INJECTION, SOLUTION INTRAVENOUS; SUBCUTANEOUS at 21:03

## 2017-12-01 RX ADMIN — PREDNISONE 20 MG: 20 TABLET ORAL at 21:03

## 2017-12-01 RX ADMIN — HEPARIN SODIUM 5000 UNITS: 5000 INJECTION, SOLUTION INTRAVENOUS; SUBCUTANEOUS at 16:11

## 2017-12-01 RX ADMIN — METOPROLOL TARTRATE 25 MG: 25 TABLET, FILM COATED ORAL at 08:05

## 2017-12-01 RX ADMIN — STANDARDIZED SENNA CONCENTRATE AND DOCUSATE SODIUM 2 TABLET: 8.6; 5 TABLET, FILM COATED ORAL at 08:05

## 2017-12-01 RX ADMIN — IPRATROPIUM BROMIDE AND ALBUTEROL SULFATE 3 ML: .5; 3 SOLUTION RESPIRATORY (INHALATION) at 20:38

## 2017-12-01 RX ADMIN — OMEPRAZOLE 20 MG: 20 CAPSULE, DELAYED RELEASE ORAL at 08:05

## 2017-12-01 RX ADMIN — TIOTROPIUM BROMIDE 1 CAPSULE: 18 CAPSULE ORAL; RESPIRATORY (INHALATION) at 07:37

## 2017-12-01 RX ADMIN — INSULIN HUMAN 3 UNITS: 100 INJECTION, SOLUTION PARENTERAL at 17:48

## 2017-12-01 RX ADMIN — FLUTICASONE PROPIONATE 50 MCG: 50 SPRAY, METERED NASAL at 21:02

## 2017-12-01 RX ADMIN — LOSARTAN POTASSIUM 50 MG: 50 TABLET, FILM COATED ORAL at 08:05

## 2017-12-01 ASSESSMENT — PAIN SCALES - GENERAL
PAINLEVEL_OUTOF10: 4
PAINLEVEL_OUTOF10: 0
PAINLEVEL_OUTOF10: 7
PAINLEVEL_OUTOF10: 7
PAINLEVEL_OUTOF10: 4

## 2017-12-01 ASSESSMENT — ENCOUNTER SYMPTOMS
BLOOD IN STOOL: 0
WEIGHT LOSS: 0
SORE THROAT: 0
DIAPHORESIS: 0
CHILLS: 0
DIZZINESS: 0
NERVOUS/ANXIOUS: 0
WHEEZING: 1
NEUROLOGICAL NEGATIVE: 1
BLURRED VISION: 0
NAUSEA: 0
LOSS OF CONSCIOUSNESS: 0
WEAKNESS: 0
EYE PAIN: 0
EYE REDNESS: 0
SPUTUM PRODUCTION: 1
TREMORS: 0
FLANK PAIN: 0
BACK PAIN: 0
PSYCHIATRIC NEGATIVE: 1
STRIDOR: 0
FALLS: 0
FEVER: 0
COUGH: 1
POLYDIPSIA: 0
SINUS PAIN: 0
MUSCULOSKELETAL NEGATIVE: 1
SEIZURES: 0
PND: 0
HEADACHES: 0
PHOTOPHOBIA: 0
SHORTNESS OF BREATH: 1
CLAUDICATION: 0
EYE DISCHARGE: 0
EYES NEGATIVE: 1
HEMOPTYSIS: 0
CONSTITUTIONAL NEGATIVE: 1
ORTHOPNEA: 0
GASTROINTESTINAL NEGATIVE: 1
HEARTBURN: 0
FOCAL WEAKNESS: 0
PALPITATIONS: 0
DIARRHEA: 0
CONSTIPATION: 0
HALLUCINATIONS: 0
SPEECH CHANGE: 0
DEPRESSION: 0
VOMITING: 0
MYALGIAS: 0
BRUISES/BLEEDS EASILY: 0
DOUBLE VISION: 0
SENSORY CHANGE: 0
NECK PAIN: 0
INSOMNIA: 0
MEMORY LOSS: 0
TINGLING: 0
ABDOMINAL PAIN: 0

## 2017-12-01 ASSESSMENT — LIFESTYLE VARIABLES: SUBSTANCE_ABUSE: 0

## 2017-12-01 NOTE — ASSESSMENT & PLAN NOTE
Bs improved now on oral steroids  Resume home regimen and diabetic diet with close outpatient follow up

## 2017-12-01 NOTE — DISCHARGE PLANNING
Received choice form from Vasyl(VINCENZO). Referral sent to [facility].

## 2017-12-01 NOTE — PROGRESS NOTES
[facility] Hospitalist Progress Note    Date of Service: 12/1/2017    Chief Complaint  Patient is a 57 y.o. Female with history of chronic hypoxemic respiratory failure, on 3 L of oxygen at baseline who was admitted 11/28/2017 with sob and copd excacerbation    Interval Problem Update  Pleuritic pain continues to improve, cough and sob stable, YANES    Consultants/Specialty  None    Disposition  Continue current treatment, trial of ambulation today        Review of Systems   Constitutional: Negative.  Negative for chills, diaphoresis, fever, malaise/fatigue and weight loss.   HENT: Negative.  Negative for congestion, ear discharge, ear pain, hearing loss, nosebleeds, sinus pain, sore throat and tinnitus.    Eyes: Negative.  Negative for blurred vision, double vision, photophobia, pain, discharge and redness.   Respiratory: Positive for cough, sputum production, shortness of breath and wheezing. Negative for hemoptysis and stridor.    Cardiovascular: Positive for chest pain (pleuritic, stable, present x 1 week). Negative for palpitations, orthopnea, claudication, leg swelling and PND.   Gastrointestinal: Negative.  Negative for abdominal pain, blood in stool, constipation, diarrhea, heartburn, melena, nausea and vomiting.   Genitourinary: Negative.  Negative for dysuria, flank pain, frequency, hematuria and urgency.   Musculoskeletal: Negative.  Negative for back pain, falls, joint pain, myalgias and neck pain.   Skin: Negative.  Negative for itching and rash.   Neurological: Negative.  Negative for dizziness, tingling, tremors, sensory change, speech change, focal weakness, seizures, loss of consciousness, weakness and headaches.   Endo/Heme/Allergies: Negative.  Negative for environmental allergies and polydipsia. Does not bruise/bleed easily.   Psychiatric/Behavioral: Negative.  Negative for depression, hallucinations, memory loss, substance abuse and suicidal ideas. The patient is not nervous/anxious and does not have  insomnia.    All other systems reviewed and are negative.     Physical Exam  Laboratory/Imaging   Hemodynamics  Temp (24hrs), Av.8 °C (98.2 °F), Min:36.5 °C (97.7 °F), Max:37.1 °C (98.8 °F)   Temperature: 36.5 °C (97.7 °F)  Pulse  Av  Min: 53  Max: 112   Blood Pressure: 133/87      Respiratory      Respiration: 15, Pulse Oximetry: 96 %, O2 Daily Delivery Respiratory : Silicone Nasal Cannula     Given By:: Mouthpiece, #MDI/DPI Given: MDI/DPI x 1, Work Of Breathing / Effort: Mild  RUL Breath Sounds: Coarse Crackles, RML Breath Sounds: Diminished, RLL Breath Sounds: Diminished, RADHA Breath Sounds: Coarse Crackles, LLL Breath Sounds: Diminished    Fluids  No intake or output data in the 24 hours ending 17 1104    Nutrition  Orders Placed This Encounter   Procedures   • Diet Order     Standing Status:   Standing     Number of Occurrences:   1     Order Specific Question:   Diet:     Answer:   Diabetic [3]     Physical Exam   Constitutional: She is oriented to person, place, and time. She appears well-developed and well-nourished. No distress.   HENT:   Head: Normocephalic and atraumatic.   Mouth/Throat: Oropharynx is clear and moist. No oropharyngeal exudate.   Eyes: Conjunctivae and EOM are normal. Pupils are equal, round, and reactive to light. Right eye exhibits no discharge. Left eye exhibits no discharge. No scleral icterus.   Neck: Normal range of motion. Neck supple. No tracheal deviation present. No thyromegaly present.   Cardiovascular: Normal rate, normal heart sounds and intact distal pulses.  Exam reveals no gallop and no friction rub.    No murmur heard.  Pulmonary/Chest: No stridor. No respiratory distress. She has wheezes (with upper airway rhonchi, bilaterally). She has no rales. She exhibits no tenderness.   Some improvement in air mvt lower lobes vs yesterday   Abdominal: Soft. Bowel sounds are normal. She exhibits no distension and no mass. There is no tenderness. There is no rebound and  no guarding.   Musculoskeletal: Normal range of motion. She exhibits no edema or tenderness.   Lymphadenopathy:     She has no cervical adenopathy.   Neurological: She is alert and oriented to person, place, and time. She has normal reflexes. No cranial nerve deficit. She exhibits normal muscle tone. Coordination normal.   Skin: Skin is warm and dry. No rash noted. She is not diaphoretic. No erythema. No pallor.   Psychiatric: She has a normal mood and affect. Her behavior is normal. Judgment and thought content normal.   Nursing note and vitals reviewed.      Recent Labs      11/29/17   0338  11/30/17   0347  12/01/17   0311   WBC  7.4  18.4*  13.5*   RBC  4.04*  3.95*  3.96*   HEMOGLOBIN  11.8*  11.6*  11.7*   HEMATOCRIT  35.1*  35.3*  35.6*   MCV  86.9  89.4  89.9   MCH  29.2  29.4  29.5   MCHC  33.6  32.9*  32.9*   RDW  41.1  42.5  43.1   PLATELETCT  285  331  335   MPV  10.4  10.6  10.7     Recent Labs      11/29/17   0929  11/30/17   0347  12/01/17   0311   SODIUM  136  132*  136   POTASSIUM  4.5  4.6  4.2   CHLORIDE  104  104  105   CO2  24  23  23   GLUCOSE  285*  214*  196*   BUN  16  24*  28*   CREATININE  0.59  0.76  0.88   CALCIUM  9.5  10.0  10.0         Recent Labs      11/28/17   2237   BNPBTYPENAT  31              Assessment/Plan     Non-insulin dependent type 2 diabetes mellitus (CMS-HCC)- (present on admission)   Assessment & Plan    Bs improved now on oral steroids  Continue SSI  Continue to follow        Pleuritic chest pain   Assessment & Plan    With deep breaths and cough  No ischemic changes on ekg  Trop negative  Consistent with copd exacerbation and bronchitis        Anemia   Assessment & Plan    Suspect due to chronic disease  Stable        Leukocytosis   Assessment & Plan    LIkely multifactorial  Suspect bronchitis contributing  Steroids contributing  Will check procalcitonin  Following        COPD exacerbation (CMS-HCC)   Assessment & Plan    Clinically improving today  Suspect  secondary bronchitis  Continue respiratory treatments  YANES, patient states she is unable to d/c today due to degree of YANES on exertion          Tobacco dependence   Assessment & Plan    Cessation recommended  Cessation strategies discussed          Quality-Core Measures

## 2017-12-01 NOTE — DISCHARGE PLANNING
Medical Social Worker    Pt lives in [place], [place]. Pt is not able to get services through [facility] as pt owes them money and has been sent to collections. Pt's only other option is [facility]. [facility] cannot send order however, until sats information is put in the order and FTF. SW updated attending and hospitalist RN.

## 2017-12-01 NOTE — PROGRESS NOTES
Assumed care of pt. A/ox4, able to make needs known. Bilateral PIV's in AC's, saline locked and flushing well. 3 L o2 via NC and  in use. Diabetic diet, accu checks ACHS. POC home. Call light in reach, bed in low position, will continue hourly rounding.

## 2017-12-01 NOTE — FACE TO FACE
Face to Face Note  -  Durable Medical Equipment    Tona Max M.D. - NPI: [id number]  I certify that this patient is under my care and that they had a durable medical equipment(DME)face to face encounter by myself that meets the physician DME face-to-face encounter requirements with this patient on:    Date of encounter:   Patient:                    MRN:                       YOB: 2017  Joel Day  [id number]  1960     The encounter with the patient was in whole, or in part, for the following medical condition, which is the primary reason for durable medical equipment:  COPD    I certify that, based on my findings, the following durable medical equipment is medically necessary:  Oxygen.    HOME O2 Saturation Measurements:(Values must be present for Home Oxygen orders)  Room air sat at rest: 89  Room air sat with amb: 86  With liters of O2: 3, O2 sat at rest with O2: 95  With Liters of O2: 3, O2 sat with amb with O2 : 91  Is the patient mobile?: Yes    My Clinical findings support the need for the above equipment due to:  Hypoxia    Supporting Symptoms: johnson

## 2017-12-01 NOTE — CARE PLAN
Problem: Pain Management  Goal: Pain level will decrease to patient's comfort goal  Pain assessed. Medicated per MAR with + results.      Problem: Knowledge Deficit  Goal: Knowledge of disease process/condition, treatment plan, diagnostic tests, and medications will improve  Educated pt on need to assess oxygenation while ambulating. Pt is refusing to ambulate with RN

## 2017-12-01 NOTE — CARE PLAN
Problem: Oxygenation:  Goal: Maintain adequate oxygenation dependent on patient condition  Outcome: PROGRESSING AS EXPECTED    Intervention: Manage oxygen therapy by monitoring pulse oximetry and/or ABG values  Pt on 3L NC; SPO2 95%      Problem: Bronchoconstriction:  Goal: Improve in air movement and diminished wheezing  Outcome: PROGRESSING AS EXPECTED    Intervention: Implement inhaled treatments  DUO SHAUND, [employer]

## 2017-12-01 NOTE — PROGRESS NOTES
Geovanna Watkins Fall Risk Assessment:     Last Known Fall: No falls  Mobility: Immobilized/requires assist of one person  Medications: Cardiovascular or central nervous system meds  Mental Status/LOC/Awareness: Awake, alert, and oriented to date, place, and person  Toileting Needs: No needs  Volume/Electrolyte Status: No problems  Communication/Sensory: Visual (Glasses)/hearing deficit  Behavior: Appropriate behavior  Geovanna Watkins Fall Risk Total: 6  Fall Risk Level: NO RISK    Universal Fall Precautions:  call light/belongings in reach, bed in low position and locked, wheelchairs and assistive devices out of sight, siderails up x 2, use non-slip footwear, adequate lighting, clutter free and spill free environment, educate on level of risk, educate to call for assistance    Fall Risk Level Interventions:   TRIAL (TELE 8, NEURO, MED ERMELINDA 5) Low Fall Risk Interventions  Place yellow fall risk ID band on patient: completed  Provide patient/family education based on risk assessment: completed  Educate patient/family to call staff for assistance when getting out of bed: completed  Place fall precaution signage outside patient door: verified      Patient Specific Interventions:     Medication: review medications with patient and family  Mental Status/LOC/Awareness: reinforce falls education, check on patient hourly and reinforce the use of call light  Toileting: provide frquent toileting  Volume/Electrolyte Status: ensure patient remains hydrated, monitor blood sugars and maintain appropriate blood sugar levels if diabetic and monitor abnormal lab values  Communication/Sensory: update plan of care on whiteboard  Behavioral: not applicable  Mobility: dangle prior to standing and ensure bed is locked and in lowest position

## 2017-12-01 NOTE — CARE PLAN
Problem: Respiratory:  Goal: Respiratory status will improve    Intervention: Assess and monitor pulmonary status  Oxygen in use,  on. Will attempt oxygen assessment for home need      Problem: Communication  Goal: The ability to communicate needs accurately and effectively will improve    Intervention: [place] patient and significant other/support system to call light to alert staff of needs  Able to make needs known, instructed to call for assistance

## 2017-12-01 NOTE — PROGRESS NOTES
Pt is A+Ox4. HEIN. Denies n/t or n/v. C/o rib pain, medicated per MAR. RN attempted to perform oxygen demands while ambulating; however, pt declined second half of assessment ( ambulating with o2), refer to adult obs for without o2. Pt has been reluctant to engage in  Assessments often falling asleep during exam. Pt satting in mid 90s at 3LPM via NC. All questions answered regarding POC.

## 2017-12-01 NOTE — DISCHARGE PLANNING
Medical Social Worker    VINCENZO got DME O2 CHOICE for [facility]. VINCENZO faxed CHOICE to [facility].

## 2017-12-01 NOTE — PROGRESS NOTES
Geovanna Watkins Fall Risk Assessment:     Last Known Fall: No falls  Mobility: Immobilized/requires assist of one person  Medications: Cardiovascular or central nervous system meds  Mental Status/LOC/Awareness: Awake, alert, and oriented to date, place, and person  Toileting Needs: No needs  Volume/Electrolyte Status: No problems  Communication/Sensory: Visual (Glasses)/hearing deficit  Behavior: Appropriate behavior  Geovanna Watkins Fall Risk Total: 6  Fall Risk Level: NO RISK    Universal Fall Precautions:  call light/belongings in reach, bed in low position and locked, wheelchairs and assistive devices out of sight, siderails up x 2, use non-slip footwear, adequate lighting, clutter free and spill free environment, educate to call for assistance, educate on level of risk    Fall Risk Level Interventions:   TRIAL (TELE 8, NEURO, MED ERMELINDA 5) Low Fall Risk Interventions  Place yellow fall risk ID band on patient: completed  Provide patient/family education based on risk assessment: completed  Educate patient/family to call staff for assistance when getting out of bed: completed  Place fall precaution signage outside patient door: verified      Patient Specific Interventions:     Medication: review medications with patient and family and limit combination of prn medications  Mental Status/LOC/Awareness: reinforce falls education, check on patient hourly, utilize bed/chair fall alarm and reinforce the use of call light  Toileting: provide frquent toileting and do not leave patient unattended in bathroom/refer to toileting scripting  Volume/Electrolyte Status: monitor abnormal lab values  Communication/Sensory: update plan of care on whiteboard  Behavioral: instruct/reinforce fall program rationale  Mobility: ensure bed is locked and in lowest position

## 2017-12-01 NOTE — DISCHARGE PLANNING
Received voicemail from Aicha at [facility]. Per Aicha, patient has not been receiveing services through [facility] for some time and must be on service with different company. Vasyl(VINCENZO) notified.

## 2017-12-02 VITALS
RESPIRATION RATE: 16 BRPM | WEIGHT: 167.33 LBS | HEART RATE: 82 BPM | SYSTOLIC BLOOD PRESSURE: 129 MMHG | OXYGEN SATURATION: 90 % | HEIGHT: 66 IN | TEMPERATURE: 97.6 F | BODY MASS INDEX: 26.89 KG/M2 | DIASTOLIC BLOOD PRESSURE: 82 MMHG

## 2017-12-02 PROBLEM — D72.829 LEUKOCYTOSIS: Status: RESOLVED | Noted: 2017-11-30 | Resolved: 2017-12-02

## 2017-12-02 PROBLEM — J96.21 ACUTE ON CHRONIC RESPIRATORY FAILURE WITH HYPOXIA (HCC): Status: RESOLVED | Noted: 2017-11-29 | Resolved: 2017-12-02

## 2017-12-02 LAB
BASOPHILS # BLD AUTO: 0.4 % (ref 0–1.8)
BASOPHILS # BLD: 0.04 K/UL (ref 0–0.12)
EOSINOPHIL # BLD AUTO: 0.04 K/UL (ref 0–0.51)
EOSINOPHIL NFR BLD: 0.4 % (ref 0–6.9)
ERYTHROCYTE [DISTWIDTH] IN BLOOD BY AUTOMATED COUNT: 43.1 FL (ref 35.9–50)
GLUCOSE BLD-MCNC: 213 MG/DL (ref 65–99)
GLUCOSE BLD-MCNC: 214 MG/DL (ref 65–99)
HCT VFR BLD AUTO: 35.6 % (ref 37–47)
HGB BLD-MCNC: 11.7 G/DL (ref 12–16)
IMM GRANULOCYTES # BLD AUTO: 0.17 K/UL (ref 0–0.11)
IMM GRANULOCYTES NFR BLD AUTO: 1.7 % (ref 0–0.9)
LYMPHOCYTES # BLD AUTO: 2.05 K/UL (ref 1–4.8)
LYMPHOCYTES NFR BLD: 20.4 % (ref 22–41)
MCH RBC QN AUTO: 29.3 PG (ref 27–33)
MCHC RBC AUTO-ENTMCNC: 32.9 G/DL (ref 33.6–35)
MCV RBC AUTO: 89.2 FL (ref 81.4–97.8)
MONOCYTES # BLD AUTO: 0.9 K/UL (ref 0–0.85)
MONOCYTES NFR BLD AUTO: 9 % (ref 0–13.4)
NEUTROPHILS # BLD AUTO: 6.84 K/UL (ref 2–7.15)
NEUTROPHILS NFR BLD: 68.1 % (ref 44–72)
NRBC # BLD AUTO: 0 K/UL
NRBC BLD AUTO-RTO: 0 /100 WBC
PLATELET # BLD AUTO: 350 K/UL (ref 164–446)
PMV BLD AUTO: 10.7 FL (ref 9–12.9)
RBC # BLD AUTO: 3.99 M/UL (ref 4.2–5.4)
WBC # BLD AUTO: 10 K/UL (ref 4.8–10.8)

## 2017-12-02 PROCEDURE — 82962 GLUCOSE BLOOD TEST: CPT | Mod: 91

## 2017-12-02 PROCEDURE — 700111 HCHG RX REV CODE 636 W/ 250 OVERRIDE (IP): Performed by: HOSPITALIST

## 2017-12-02 PROCEDURE — 94640 AIRWAY INHALATION TREATMENT: CPT

## 2017-12-02 PROCEDURE — 99239 HOSP IP/OBS DSCHRG MGMT >30: CPT | Performed by: HOSPITALIST

## 2017-12-02 PROCEDURE — 700102 HCHG RX REV CODE 250 W/ 637 OVERRIDE(OP): Performed by: HOSPITALIST

## 2017-12-02 PROCEDURE — 36415 COLL VENOUS BLD VENIPUNCTURE: CPT

## 2017-12-02 PROCEDURE — 85025 COMPLETE CBC W/AUTO DIFF WBC: CPT

## 2017-12-02 PROCEDURE — 700101 HCHG RX REV CODE 250: Performed by: HOSPITALIST

## 2017-12-02 PROCEDURE — A9270 NON-COVERED ITEM OR SERVICE: HCPCS | Performed by: HOSPITALIST

## 2017-12-02 RX ORDER — IPRATROPIUM BROMIDE AND ALBUTEROL SULFATE 2.5; .5 MG/3ML; MG/3ML
3 SOLUTION RESPIRATORY (INHALATION) 4 TIMES DAILY
Qty: 30 BULLET | Refills: 0 | Status: SHIPPED | OUTPATIENT
Start: 2017-12-02

## 2017-12-02 RX ORDER — PREDNISONE 10 MG/1
TABLET ORAL
Qty: 11 TAB | Refills: 0 | Status: SHIPPED | OUTPATIENT
Start: 2017-12-02

## 2017-12-02 RX ORDER — AZITHROMYCIN 250 MG/1
TABLET, FILM COATED ORAL
Qty: 2 TAB | Refills: 0 | Status: SHIPPED | OUTPATIENT
Start: 2017-12-03

## 2017-12-02 RX ORDER — TIOTROPIUM BROMIDE 18 UG/1
18 CAPSULE ORAL; RESPIRATORY (INHALATION) DAILY
Qty: 30 CAP | Refills: 3 | Status: SHIPPED | OUTPATIENT
Start: 2017-12-03

## 2017-12-02 RX ADMIN — HEPARIN SODIUM 5000 UNITS: 5000 INJECTION, SOLUTION INTRAVENOUS; SUBCUTANEOUS at 14:02

## 2017-12-02 RX ADMIN — OMEPRAZOLE 20 MG: 20 CAPSULE, DELAYED RELEASE ORAL at 08:57

## 2017-12-02 RX ADMIN — BUPROPION HYDROCHLORIDE 150 MG: 150 TABLET, EXTENDED RELEASE ORAL at 09:02

## 2017-12-02 RX ADMIN — INSULIN GLARGINE 30 UNITS: 100 INJECTION, SOLUTION SUBCUTANEOUS at 10:26

## 2017-12-02 RX ADMIN — IPRATROPIUM BROMIDE AND ALBUTEROL SULFATE 3 ML: .5; 3 SOLUTION RESPIRATORY (INHALATION) at 15:30

## 2017-12-02 RX ADMIN — IPRATROPIUM BROMIDE AND ALBUTEROL SULFATE 3 ML: .5; 3 SOLUTION RESPIRATORY (INHALATION) at 09:10

## 2017-12-02 RX ADMIN — INSULIN HUMAN 4 UNITS: 100 INJECTION, SOLUTION PARENTERAL at 11:50

## 2017-12-02 RX ADMIN — NICOTINE 14 MG: 14 PATCH, EXTENDED RELEASE TRANSDERMAL at 06:10

## 2017-12-02 RX ADMIN — LOSARTAN POTASSIUM 50 MG: 50 TABLET, FILM COATED ORAL at 09:04

## 2017-12-02 RX ADMIN — FLUTICASONE PROPIONATE 50 MCG: 50 SPRAY, METERED NASAL at 09:02

## 2017-12-02 RX ADMIN — TIOTROPIUM BROMIDE 1 CAPSULE: 18 CAPSULE ORAL; RESPIRATORY (INHALATION) at 09:10

## 2017-12-02 RX ADMIN — GABAPENTIN 300 MG: 300 CAPSULE ORAL at 09:03

## 2017-12-02 RX ADMIN — METOPROLOL TARTRATE 25 MG: 25 TABLET, FILM COATED ORAL at 08:57

## 2017-12-02 RX ADMIN — AZITHROMYCIN 250 MG: 250 TABLET, FILM COATED ORAL at 08:57

## 2017-12-02 RX ADMIN — HEPARIN SODIUM 5000 UNITS: 5000 INJECTION, SOLUTION INTRAVENOUS; SUBCUTANEOUS at 06:10

## 2017-12-02 RX ADMIN — ESCITALOPRAM OXALATE 20 MG: 10 TABLET ORAL at 09:02

## 2017-12-02 RX ADMIN — IPRATROPIUM BROMIDE AND ALBUTEROL SULFATE 3 ML: .5; 3 SOLUTION RESPIRATORY (INHALATION) at 12:21

## 2017-12-02 RX ADMIN — GABAPENTIN 300 MG: 300 CAPSULE ORAL at 14:03

## 2017-12-02 RX ADMIN — INSULIN HUMAN 4 UNITS: 100 INJECTION, SOLUTION PARENTERAL at 06:16

## 2017-12-02 RX ADMIN — PREDNISONE 20 MG: 20 TABLET ORAL at 09:05

## 2017-12-02 RX ADMIN — STANDARDIZED SENNA CONCENTRATE AND DOCUSATE SODIUM 2 TABLET: 8.6; 5 TABLET, FILM COATED ORAL at 09:05

## 2017-12-02 RX ADMIN — LEVOTHYROXINE SODIUM 50 MCG: 50 TABLET ORAL at 06:11

## 2017-12-02 ASSESSMENT — PAIN SCALES - GENERAL
PAINLEVEL_OUTOF10: 0

## 2017-12-02 NOTE — CARE PLAN
Problem: Pain Management  Goal: Pain level will decrease to patient's comfort goal    Intervention: Follow pain managment plan developed in collaboration with patient and Interdisciplinary Team  Patient denies pain at this time, will continue to assess.

## 2017-12-02 NOTE — DISCHARGE PLANNING
Staffed case with mauricio Melvin in the event pt refuses to take DME equipment with her upon discharge, equipment can be returned to [facility].  Spoke with bedside RN as well.    Ngozi can be reached at [phone].

## 2017-12-02 NOTE — DISCHARGE PLANNING
Call placed to  [facility] office, left message for return call from on call .

## 2017-12-02 NOTE — PROGRESS NOTES
Pt is A+Ox4. HEIN. Denies nv or nt. Lung sounds are course t/o, worse than prior day. Encouraged to utilize IS more frequently. 3 LPM via NC satting in 90s. Up SBA + voiding. All questions answered regarding POC.

## 2017-12-02 NOTE — DISCHARGE PLANNING
Received call from Rosaline at [employer], they will transport patient today at 1430 via the [employer] van.to her home in [facility].  Job number is [id number]. SUSAN Guzman has been advised of transport time.

## 2017-12-02 NOTE — DISCHARGE PLANNING
Met with pt at bedside on multiple occassions this afternoon.  Pt is now declining to take [facility] van transport back to [place] and also refuses to take O2 tanks and concentrator with her.  She states she is essentially homeless and would have no where to put equipment.  Her initial plan was to d/c to a motel in [place] where her daughter works.  She apparently has another daughter in [place] who gets off work at 5pm this date and has agreed to come and pick her up.  Pt called her daughter Rosario while SW was at bedside and asked for a ride.  Pt's plan is to stay the night with her daughter in [place].  SW emphasized that if for some reason her daughter is unable to make it to the hospital she will have to d/c to homeless shelter here in [place].  Pt verbalized understanding.  She receives social security income each month (about $1,000 per patient) and states she is working on finding housing.  She states she is on the wait list for affordable housing in [place].      VINCENZO staffed with attending Dr. Max to provide update re: pt refusing to bring O2 equipment with her.  Dr. Max is aware and advised to continue with discharge.  VINCENZO updated bedside RN as well.

## 2017-12-02 NOTE — DISCHARGE PLANNING
Per CCT Sin Guzman delivered the oxygen to patients room.  Spoke with Rosaline in [facility], she will confirm if they can transport to [place] today, and if they have can transport patient with concentrator and 6 tanks of oxygen.  Awaiting return call.

## 2017-12-02 NOTE — CARE PLAN
Problem: Safety  Goal: Will remain free from injury  Outcome: PROGRESSING AS EXPECTED  Patient remains free from injury, will continue to assess patient status.

## 2017-12-02 NOTE — DISCHARGE PLANNING
Received call from CCT Megan, patient has now refused to transfer with the [facility] van and her oxygen to [place].  Transport has been cancelled per CCT Megan. Rosaline in [facility] has been advised.

## 2017-12-02 NOTE — DISCHARGE SUMMARY
CHIEF COMPLAINT ON ADMISSION  Chief Complaint   Patient presents with   • Shortness of Breath     Pt is transfer from [facility] for higher level of care. Recieved report from flight crew.  Pt with a light non productive cough, RR 24.  pt states symptoms for last couple of days worsening here to r/o pulmonary edema.       CODE STATUS  Full Code    Date of admission 11/28/17  Date of discharge 12/2/17    HPI & HOSPITAL COURSE  Please see History and Physical by dr Arshad 11/29/17 for further details    This is a 57 y.o. Female with copd, ongoing nicotine dependence and chronic hypoxemic respiratory failure, normally on 3L of home oxygen here with sob due to a copd exacerbation and bronchitis.  She responded well to empiric treatment and now is back near her baseline.     Therefore, she is discharged in fair and stable condition with close outpatient follow-up.        DISCHARGE PROBLEM LIST  Active Problems:    Non-insulin dependent type 2 diabetes mellitus (CMS-[facility]) POA: Yes      Overview: New history of diabetes mellitus      Hemoglobin A1c 9.8.       BS increased due to iv steroids, will change to oral      Continue SSI      Following    Tobacco dependence POA: Unknown    COPD exacerbation (CMS-[facility]) POA: Unknown    Acute on chronic respiratory failure with hypoxia (CMS-[facility]) POA: Unknown    Leukocytosis POA: Unknown    Anemia POA: Unknown    Pleuritic chest pain POA: Unknown  Resolved Problems:    * No resolved hospital problems. *    Discharge diagnosis:   Non-insulin dependent type 2 diabetes mellitus (CMS-HCC)- (present on admission)   Assessment & Plan    Bs improved now on oral steroids  Resume home regimen and diabetic diet with close outpatient follow up        Pleuritic chest pain   Assessment & Plan    With deep breaths and cough  No ischemic changes on ekg  Trop negative  Consistent with copd exacerbation and bronchitis  Resolving        Anemia   Assessment & Plan    Suspect due to chronic  disease  Stable  Patient advised to follow up as outpatient        Leukocytosis   Assessment & Plan    Resolved  Likely due to iv steroids        COPD exacerbation (CMS-HCC)   Assessment & Plan    Clinically improved  Suspect secondary bronchitis  Continue respiratory treatments  YANES much improved, clinically back near baseline  Exam improved  Home oxygen set up  Chronic hypoxemic respiratory failure at baseline  Will d/c on oral abx and steroid taper          Tobacco dependence   Assessment & Plan    Cessation recommended Ready to quit: Not Answered  Counseling given: Not Answered  Less than 10 minutes  Cessation strategies discussed                 FOLLOW UP  Establish with [facility] group within 2 weeks      MEDICATIONS ON DISCHARGE  Patient will resume her chronic outpatient medications as follows:   1. Flexeril  2.  Lantus 30 units b.i.d.  3.  TriCor.  4.  Gabapentin 300 mg 3 times a day.  5.  Wellbutrin.  6.  Elavil.  7.  Omeprazole.  8.  Spiriva.  9.  Flonase.  10.  Lexapro 20 mg daily.  11.  Synthroid 50 mcg daily.  12.  Cozaar 50 mg daily.  13.  Metoprolol 25 mg b.i.d.  14.  NovoLog sliding scale.  15.  Naprosyn.   New discharge medications include:  1. Prednisone taper  2. Azithromycin  3. Duoneb  4. She will also continue supplemental oxygen 3L by NC         DIET  Orders Placed This Encounter   Procedures   • Diet Order     Standing Status:   Standing     Number of Occurrences:   1     Order Specific Question:   Diet:     Answer:   Diabetic [3]       ACTIVITY  As tolerated.      Hemodynamics  Temp (24hrs), Av.6 °C (97.8 °F), Min:36.4 °C (97.5 °F), Max:36.8 °C (98.2 °F)   Temperature: 36.4 °C (97.6 °F)  Pulse  Av.8  Min: 53  Max: 112   Blood Pressure: 129/82      Respiratory      Respiration: 16, Pulse Oximetry: 97 %, O2 Daily Delivery Respiratory : Silicone Nasal Cannula     Given By:: Mouthpiece, #MDI/DPI Given: MDI/DPI x 1, Work Of Breathing / Effort: Mild  RUL Breath Sounds: Clear, RML  Breath Sounds: Clear, RLL Breath Sounds: Clear, RADHA Breath Sounds: Clear, LLL Breath Sounds: Expiratory Wheezes    Fluids    Intake/Output Summary (Last 24 hours) at 12/02/17 1139  Last data filed at 12/02/17 0800   Gross per 24 hour   Intake              240 ml   Output                0 ml   Net              240 ml       Nutrition  Orders Placed This Encounter   Procedures   • Diet Order     Standing Status:   Standing     Number of Occurrences:   1     Order Specific Question:   Diet:     Answer:   Diabetic [3]     Physical Exam   Constitutional: She is oriented to person, place, and time. She appears well-developed and well-nourished. No distress.   HENT:   Head: Normocephalic and atraumatic.   Mouth/Throat: Oropharynx is clear and moist. No oropharyngeal exudate.   Eyes: Conjunctivae and EOM are normal. Pupils are equal, round, and reactive to light. Right eye exhibits no discharge. Left eye exhibits no discharge. No scleral icterus.   Neck: Normal range of motion. Neck supple. No tracheal deviation present. No thyromegaly present.   Cardiovascular: Normal rate, normal heart sounds and intact distal pulses.  Exam reveals no gallop and no friction rub.    No murmur heard.  Pulmonary/Chest: No stridor. No respiratory distress. She has no wheezes (with upper airway rhonchi, bilaterally). She has no rales. She exhibits no tenderness.   Air movement continues to improve   Abdominal: Soft. Bowel sounds are normal. She exhibits no distension and no mass. There is no tenderness. There is no rebound and no guarding.   Musculoskeletal: Normal range of motion. She exhibits no edema or tenderness.   Lymphadenopathy:     She has no cervical adenopathy.   Neurological: She is alert and oriented to person, place, and time. She has normal reflexes. No cranial nerve deficit. She exhibits normal muscle tone. Coordination normal.   Skin: Skin is warm and dry. No rash noted. She is not diaphoretic. No erythema. No pallor.    Psychiatric: She has a normal mood and affect. Her behavior is normal. Judgment and thought content normal.   Nursing note and vitals reviewed.               CONSULTATIONS  None    PROCEDURES  CTa chest shows no evidence of a pulmonary emboli.  Mild bronchial wall thickening consistent with  Bronchitis, chronic scarring and a stable 1 cm thyroid lobe mass (which pt is aware of and will f/u with pcp)      LABORATORY  Lab Results   Component Value Date/Time    SODIUM 136 12/01/2017 03:11 AM    POTASSIUM 4.2 12/01/2017 03:11 AM    CHLORIDE 105 12/01/2017 03:11 AM    CO2 23 12/01/2017 03:11 AM    GLUCOSE 196 (H) 12/01/2017 03:11 AM    BUN 28 (H) 12/01/2017 03:11 AM    CREATININE 0.88 12/01/2017 03:11 AM        Lab Results   Component Value Date/Time    WBC 10.0 12/02/2017 02:19 AM    HEMOGLOBIN 11.7 (L) 12/02/2017 02:19 AM    HEMATOCRIT 35.6 (L) 12/02/2017 02:19 AM    PLATELETCT 350 12/02/2017 02:19 AM        Total time of the discharge process exceeds 45 minutes

## 2017-12-02 NOTE — CARE PLAN
Problem: Infection  Goal: Will remain free from infection  IS provided to pt. Pt needs encouragement     Problem: Bowel/Gastric:  Goal: Normal bowel function is maintained or improved  Pt has had a BM today.

## 2017-12-02 NOTE — CARE PLAN
Problem: Oxygenation:  Goal: Maintain adequate oxygenation dependent on patient condition  3LNC    Problem: Bronchoconstriction:  Goal: Improve in air movement and diminished wheezing  DUO QID

## 2017-12-02 NOTE — DISCHARGE PLANNING
Received call from on call  Wiley with [facility].  Per Wiley they do not accept [employer] insurance.  Call placed to [facility] , per Lizzeth  they also do not accept patients Insurance. CCT Megan advised.  Referral sent to [employer] at 1041 on 12-02-17

## 2017-12-03 ENCOUNTER — PATIENT OUTREACH (OUTPATIENT)
Dept: HEALTH INFORMATION MANAGEMENT | Facility: OTHER | Age: 57
End: 2017-12-03

## 2017-12-03 NOTE — DISCHARGE INSTRUCTIONS
Discharge Instructions    Discharged to home by car with relative. Discharged via wheelchair, hospital escort: Yes.  Special equipment needed: Oxygen    Be sure to schedule a follow-up appointment with your primary care doctor or any specialists as instructed.     Discharge Plan:   Smoking Cessation Offered: Patient Counseled  Pneumococcal Vaccine Given - only chart on this line when given: Given (See MAR)  Influenza Vaccine Indication: Indicated: 9 to 64 years of age  Influenza Vaccine Given - only chart on this line when given: Influenza Vaccine Given (See MAR)    I understand that a diet low in cholesterol, fat, and sodium is recommended for good health. Unless I have been given specific instructions below for another diet, I accept this instruction as my diet prescription.   Other diet: Regular    Special Instructions: None    · Is patient discharged on Warfarin / Coumadin?   No     · Is patient Post Blood Transfusion?  No    Depression / Suicide Risk    As you are discharged from this [facility] facility, it is important to learn how to keep safe from harming yourself.    Recognize the warning signs:  · Abrupt changes in personality, positive or negative- including increase in energy   · Giving away possessions  · Change in eating patterns- significant weight changes-  positive or negative  · Change in sleeping patterns- unable to sleep or sleeping all the time   · Unwillingness or inability to communicate  · Depression  · Unusual sadness, discouragement and loneliness  · Talk of wanting to die  · Neglect of personal appearance   · Rebelliousness- reckless behavior  · Withdrawal from people/activities they love  · Confusion- inability to concentrate     If you or a loved one observes any of these behaviors or has concerns about self-harm, here's what you can do:  · Talk about it- your feelings and reasons for harming yourself  · Remove any means that you might use to hurt yourself (examples: pills, rope,  extension cords, firearm)  · Get professional help from the community (Mental Health, Substance Abuse, psychological counseling)  · Do not be alone:Call your Safe Contact- someone whom you trust who will be there for you.  · Call your local CRISIS HOTLINE 537-1918 or 576-093-2850  · Call your local Children's Mobile Crisis Response Team [place] (435) 830-3777 or www.Backand  · Call the toll free National Suicide Prevention Hotlines   · National Suicide Prevention Lifeline 207-978-UIDK (1317)  · National Hope Line Network 800-SUICIDE (189-6356)

## 2017-12-03 NOTE — PROGRESS NOTES
Placed discharge outreach phone call to patient s/p hospital discharge 12/2/17.  Left voicemail providing my contact information and instructions to call with any questions or concerns.

## 2018-02-21 LAB — EKG IMPRESSION: NORMAL

## 2018-03-02 ENCOUNTER — APPOINTMENT (OUTPATIENT)
Dept: INTERNAL MEDICINE | Facility: MEDICAL CENTER | Age: 58
End: 2018-03-02

## 2018-03-25 ENCOUNTER — APPOINTMENT (OUTPATIENT)
Dept: RADIOLOGY | Facility: MEDICAL CENTER | Age: 58
End: 2018-03-25
Attending: EMERGENCY MEDICINE
Payer: MEDICAID

## 2018-03-25 ENCOUNTER — HOSPITAL ENCOUNTER (EMERGENCY)
Facility: MEDICAL CENTER | Age: 58
End: 2018-03-26
Attending: EMERGENCY MEDICINE
Payer: MEDICAID

## 2018-03-25 VITALS
WEIGHT: 178.57 LBS | TEMPERATURE: 99.4 F | HEART RATE: 93 BPM | OXYGEN SATURATION: 94 % | RESPIRATION RATE: 15 BRPM | DIASTOLIC BLOOD PRESSURE: 117 MMHG | BODY MASS INDEX: 28.82 KG/M2 | SYSTOLIC BLOOD PRESSURE: 209 MMHG

## 2018-03-25 DIAGNOSIS — R45.5 HOSTILE BEHAVIOR: ICD-10-CM

## 2018-03-25 DIAGNOSIS — M79.602 PAIN OF LEFT UPPER EXTREMITY: ICD-10-CM

## 2018-03-25 DIAGNOSIS — R07.89 ATYPICAL CHEST PAIN: ICD-10-CM

## 2018-03-25 DIAGNOSIS — R45.6 VIOLENT BEHAVIOR: ICD-10-CM

## 2018-03-25 DIAGNOSIS — M79.605 PAIN OF LEFT LOWER EXTREMITY: ICD-10-CM

## 2018-03-25 LAB
ACETONE UR QL: NEGATIVE
ALBUMIN SERPL BCP-MCNC: 4.9 G/DL (ref 3.2–4.9)
ALBUMIN/GLOB SERPL: 1.8 G/DL
ALP SERPL-CCNC: 133 U/L (ref 30–99)
ALT SERPL-CCNC: 15 U/L (ref 2–50)
AMPHET UR QL SCN: NEGATIVE
ANION GAP SERPL CALC-SCNC: 11 MMOL/L (ref 0–11.9)
APPEARANCE UR: CLEAR
APTT PPP: 26.4 SEC (ref 24.7–36)
AST SERPL-CCNC: 15 U/L (ref 12–45)
B-OH-BUTYR SERPL-MCNC: 0.16 MMOL/L (ref 0.02–0.27)
BACTERIA #/AREA URNS HPF: ABNORMAL /HPF
BARBITURATES UR QL SCN: NEGATIVE
BASE EXCESS BLDV CALC-SCNC: 2 MMOL/L
BASOPHILS # BLD AUTO: 1.2 % (ref 0–1.8)
BASOPHILS # BLD: 0.1 K/UL (ref 0–0.12)
BENZODIAZ UR QL SCN: NEGATIVE
BILIRUB SERPL-MCNC: 0.5 MG/DL (ref 0.1–1.5)
BILIRUB UR QL STRIP.AUTO: NEGATIVE
BNP SERPL-MCNC: 6 PG/ML (ref 0–100)
BODY TEMPERATURE: ABNORMAL CENTIGRADE
BUN SERPL-MCNC: 13 MG/DL (ref 8–22)
BZE UR QL SCN: NEGATIVE
CALCIUM SERPL-MCNC: 11 MG/DL (ref 8.5–10.5)
CANNABINOIDS UR QL SCN: NEGATIVE
CHLORIDE SERPL-SCNC: 98 MMOL/L (ref 96–112)
CO2 SERPL-SCNC: 25 MMOL/L (ref 20–33)
COLOR UR: YELLOW
CREAT SERPL-MCNC: 0.83 MG/DL (ref 0.5–1.4)
CULTURE IF INDICATED INDCX: YES UA CULTURE
DEPRECATED D DIMER PPP IA-ACNC: 260 NG/ML(D-DU)
EKG IMPRESSION: NORMAL
EOSINOPHIL # BLD AUTO: 0.14 K/UL (ref 0–0.51)
EOSINOPHIL NFR BLD: 1.6 % (ref 0–6.9)
EPI CELLS #/AREA URNS HPF: ABNORMAL /HPF
ERYTHROCYTE [DISTWIDTH] IN BLOOD BY AUTOMATED COUNT: 39.8 FL (ref 35.9–50)
GLOBULIN SER CALC-MCNC: 2.8 G/DL (ref 1.9–3.5)
GLUCOSE BLD-MCNC: 380 MG/DL (ref 65–99)
GLUCOSE SERPL-MCNC: 289 MG/DL (ref 65–99)
GLUCOSE UR STRIP.AUTO-MCNC: 250 MG/DL
HCO3 BLDV-SCNC: 25 MMOL/L (ref 24–28)
HCT VFR BLD AUTO: 43.9 % (ref 37–47)
HGB BLD-MCNC: 15.2 G/DL (ref 12–16)
HYALINE CASTS #/AREA URNS LPF: ABNORMAL /LPF
IMM GRANULOCYTES # BLD AUTO: 0.02 K/UL (ref 0–0.11)
IMM GRANULOCYTES NFR BLD AUTO: 0.2 % (ref 0–0.9)
INR PPP: 0.86 (ref 0.87–1.13)
KETONES UR STRIP.AUTO-MCNC: NEGATIVE MG/DL
LEUKOCYTE ESTERASE UR QL STRIP.AUTO: NEGATIVE
LIPASE SERPL-CCNC: 50 U/L (ref 11–82)
LYMPHOCYTES # BLD AUTO: 2.42 K/UL (ref 1–4.8)
LYMPHOCYTES NFR BLD: 28.4 % (ref 22–41)
MAGNESIUM SERPL-MCNC: 1.9 MG/DL (ref 1.5–2.5)
MCH RBC QN AUTO: 29.4 PG (ref 27–33)
MCHC RBC AUTO-ENTMCNC: 34.6 G/DL (ref 33.6–35)
MCV RBC AUTO: 84.9 FL (ref 81.4–97.8)
METHADONE UR QL SCN: NEGATIVE
MICRO URNS: ABNORMAL
MONOCYTES # BLD AUTO: 0.51 K/UL (ref 0–0.85)
MONOCYTES NFR BLD AUTO: 6 % (ref 0–13.4)
NEUTROPHILS # BLD AUTO: 5.32 K/UL (ref 2–7.15)
NEUTROPHILS NFR BLD: 62.6 % (ref 44–72)
NITRITE UR QL STRIP.AUTO: NEGATIVE
NRBC # BLD AUTO: 0 K/UL
NRBC BLD-RTO: 0 /100 WBC
OPIATES UR QL SCN: POSITIVE
OSMOLALITY SERPL: 301 MOSM/KG H2O (ref 278–298)
OXYCODONE UR QL SCN: NEGATIVE
PCO2 BLDV: 36.1 MMHG (ref 41–51)
PCP UR QL SCN: NEGATIVE
PH BLDV: 7.47 [PH] (ref 7.31–7.45)
PH UR STRIP.AUTO: 6 [PH]
PHOSPHATE SERPL-MCNC: 3.6 MG/DL (ref 2.5–4.5)
PLATELET # BLD AUTO: 312 K/UL (ref 164–446)
PMV BLD AUTO: 10.5 FL (ref 9–12.9)
PO2 BLDV: 56.6 MMHG (ref 25–40)
POTASSIUM SERPL-SCNC: 4.1 MMOL/L (ref 3.6–5.5)
PROPOXYPH UR QL SCN: NEGATIVE
PROT SERPL-MCNC: 7.7 G/DL (ref 6–8.2)
PROT UR QL STRIP: NEGATIVE MG/DL
PROTHROMBIN TIME: 11.4 SEC (ref 12–14.6)
RBC # BLD AUTO: 5.17 M/UL (ref 4.2–5.4)
RBC # URNS HPF: ABNORMAL /HPF
RBC UR QL AUTO: ABNORMAL
SAO2 % BLDV: 90.3 %
SODIUM SERPL-SCNC: 134 MMOL/L (ref 135–145)
SP GR UR REFRACTOMETRY: >1.045
TROPONIN I SERPL-MCNC: <0.01 NG/ML (ref 0–0.04)
UROBILINOGEN UR STRIP.AUTO-MCNC: 0.2 MG/DL
WBC # BLD AUTO: 8.5 K/UL (ref 4.8–10.8)
WBC #/AREA URNS HPF: ABNORMAL /HPF

## 2018-03-25 PROCEDURE — 83735 ASSAY OF MAGNESIUM: CPT

## 2018-03-25 PROCEDURE — 81001 URINALYSIS AUTO W/SCOPE: CPT | Mod: XU

## 2018-03-25 PROCEDURE — 96361 HYDRATE IV INFUSION ADD-ON: CPT

## 2018-03-25 PROCEDURE — 82010 KETONE BODYS QUAN: CPT

## 2018-03-25 PROCEDURE — 80053 COMPREHEN METABOLIC PANEL: CPT

## 2018-03-25 PROCEDURE — 81002 URINALYSIS NONAUTO W/O SCOPE: CPT | Mod: XU

## 2018-03-25 PROCEDURE — 93971 EXTREMITY STUDY: CPT

## 2018-03-25 PROCEDURE — 71275 CT ANGIOGRAPHY CHEST: CPT

## 2018-03-25 PROCEDURE — 700117 HCHG RX CONTRAST REV CODE 255: Performed by: EMERGENCY MEDICINE

## 2018-03-25 PROCEDURE — 83036 HEMOGLOBIN GLYCOSYLATED A1C: CPT

## 2018-03-25 PROCEDURE — 83690 ASSAY OF LIPASE: CPT

## 2018-03-25 PROCEDURE — 93005 ELECTROCARDIOGRAM TRACING: CPT

## 2018-03-25 PROCEDURE — 96374 THER/PROPH/DIAG INJ IV PUSH: CPT

## 2018-03-25 PROCEDURE — 700111 HCHG RX REV CODE 636 W/ 250 OVERRIDE (IP): Performed by: EMERGENCY MEDICINE

## 2018-03-25 PROCEDURE — 80307 DRUG TEST PRSMV CHEM ANLYZR: CPT

## 2018-03-25 PROCEDURE — 700105 HCHG RX REV CODE 258: Performed by: EMERGENCY MEDICINE

## 2018-03-25 PROCEDURE — 82803 BLOOD GASES ANY COMBINATION: CPT

## 2018-03-25 PROCEDURE — 84484 ASSAY OF TROPONIN QUANT: CPT

## 2018-03-25 PROCEDURE — 85610 PROTHROMBIN TIME: CPT

## 2018-03-25 PROCEDURE — 82962 GLUCOSE BLOOD TEST: CPT

## 2018-03-25 PROCEDURE — 71045 X-RAY EXAM CHEST 1 VIEW: CPT

## 2018-03-25 PROCEDURE — 83930 ASSAY OF BLOOD OSMOLALITY: CPT

## 2018-03-25 PROCEDURE — 99285 EMERGENCY DEPT VISIT HI MDM: CPT

## 2018-03-25 PROCEDURE — 85730 THROMBOPLASTIN TIME PARTIAL: CPT

## 2018-03-25 PROCEDURE — 36415 COLL VENOUS BLD VENIPUNCTURE: CPT

## 2018-03-25 PROCEDURE — 87086 URINE CULTURE/COLONY COUNT: CPT

## 2018-03-25 PROCEDURE — 84100 ASSAY OF PHOSPHORUS: CPT

## 2018-03-25 PROCEDURE — 85379 FIBRIN DEGRADATION QUANT: CPT

## 2018-03-25 PROCEDURE — 304561 HCHG STAT O2

## 2018-03-25 PROCEDURE — 93005 ELECTROCARDIOGRAM TRACING: CPT | Performed by: EMERGENCY MEDICINE

## 2018-03-25 PROCEDURE — 83880 ASSAY OF NATRIURETIC PEPTIDE: CPT

## 2018-03-25 PROCEDURE — 85025 COMPLETE CBC W/AUTO DIFF WBC: CPT

## 2018-03-25 RX ORDER — MORPHINE SULFATE 10 MG/ML
8 INJECTION, SOLUTION INTRAMUSCULAR; INTRAVENOUS ONCE
Status: COMPLETED | OUTPATIENT
Start: 2018-03-25 | End: 2018-03-25

## 2018-03-25 RX ORDER — SODIUM CHLORIDE 9 MG/ML
2000 INJECTION, SOLUTION INTRAVENOUS ONCE
Status: COMPLETED | OUTPATIENT
Start: 2018-03-25 | End: 2018-03-25

## 2018-03-25 RX ADMIN — SODIUM CHLORIDE 2000 ML: 9 INJECTION, SOLUTION INTRAVENOUS at 19:39

## 2018-03-25 RX ADMIN — MORPHINE SULFATE 8 MG: 10 INJECTION INTRAVENOUS at 20:06

## 2018-03-25 RX ADMIN — IOHEXOL 70 ML: 350 INJECTION, SOLUTION INTRAVENOUS at 21:21

## 2018-03-25 ASSESSMENT — PAIN SCALES - GENERAL: PAINLEVEL_OUTOF10: 10

## 2018-03-25 ASSESSMENT — LIFESTYLE VARIABLES: DO YOU DRINK ALCOHOL: NO

## 2018-03-26 ENCOUNTER — PATIENT OUTREACH (OUTPATIENT)
Dept: HEALTH INFORMATION MANAGEMENT | Facility: OTHER | Age: 58
End: 2018-03-26

## 2018-03-26 LAB
EST. AVERAGE GLUCOSE BLD GHB EST-MCNC: 235 MG/DL
HBA1C MFR BLD: 9.8 % (ref 0–5.6)

## 2018-03-26 NOTE — PROGRESS NOTES
3/26/18 at 1:05 PM--Incoming VM from pt at 11:54 AM.  Placed phone call to pt s/p ER discharge 3/26/18.  Pt states she was seen in the ER yesterday and does not have any of her results of her testing.  Pt states she does not have a regular PCP.  Informed pt that she can f/u at [facility], [facility], or [facility].  Pt states she cannot walk and states she is not going to go to [facility] and wait to be seen.  Informed pt that she should return to ER for any new or worsening s/s.  Pt irate, using curse words.  Pt states she would like her test results.  Transferred pt to ER for further assistance per request.

## 2018-03-26 NOTE — ED TRIAGE NOTES
Pt also reports hx of diabetes, compliant with meds but w/ uncontrolled blood sugars recently.  fsbs 380.

## 2018-03-26 NOTE — DISCHARGE INSTRUCTIONS
All of your tests here were very reassuring.  The CT scan of your chest did not show any evidence of clots in your lungs.  Your ultrasounds did not show clots in your arm or leg.  There is no evidence of any dangerous cause of her symptoms.  Please follow up with your primary care doctor regarding your poorly controlled blood sugar.  If you don't have a primary care doctor, use the contact information for the clinics that we have provided.    Chest Pain, Nonspecific  It is often hard to give a specific diagnosis for the cause of chest pain. There is always a chance that your pain could be related to something serious, like a heart attack or a blood clot in the lungs. You need to follow up with your caregiver for further evaluation. More lab tests or other studies such as X-rays, electrocardiography, stress testing, or cardiac imaging may be needed to find the cause of your pain.  Most of the time, nonspecific chest pain improves within 2 to 3 days with rest and mild pain medicine. For the next few days, avoid physical exertion or activities that bring on pain. Do not smoke. Avoid drinking alcohol. Call your caregiver for routine follow-up as advised.   SEEK IMMEDIATE MEDICAL CARE IF:  · You develop increased chest pain or pain that radiates to the arm, neck, jaw, back, or abdomen.   · You develop shortness of breath, increased coughing, or you start coughing up blood.   · You have severe back or abdominal pain, nausea, or vomiting.   · You develop severe weakness, fainting, fever, or chills.   Document Released: 12/18/2006 Document Revised: 03/11/2013 Document Reviewed: 06/06/2008  [employer]® Patient Information ©2013 [employer].

## 2018-03-26 NOTE — ED NOTES
"This RN back to bedside to discuss DC. Pt ripped off monitors and pulled out her own IV. Pt yelling \"I should have gone to [facility] your aren't doing anything for me!\" Pt jumped out of bed and is getting dressed, security called as pt is visibly agitated. RN attempting to go over DC instructions, pt shouting over this RN and refusing to sign DC instructions.   "

## 2018-03-26 NOTE — ED NOTES
Pt rounded on, resting in bed, respirations even and unlabored, repositioning self as needed, updated on POC.

## 2018-03-26 NOTE — ED PROVIDER NOTES
"ED Provider Note    Scribed for Baudilio Mayer M.D. by Baudilio Mayer. 3/25/2018,  7:16 PM.    CHIEF COMPLAINT  Chief Complaint   Patient presents with   • Chest Pain   • Arm Pain   • Leg Pain       HPI  Joel Day is a 58 y.o. female who presents to the Emergency Department presenting independently by wheelchair to triage with a complaint of chest pain, left upper and lower extremity pain, shortness of breath, which all started today. She reports a history of diabetes, and reports that she is compliant with her medications, but has had uncontrolled blood sugars recently - definitely for the past 2 days. Her fingerstick on arrival is 380. She also reports a history of DVT to the left leg. She reports that this happened in September, and was accompanied by the same symptoms, specified as proximal left leg pain and shortness of breath and chest discomfort. She reports that that was diagnosed and treated here, but that when she went home to [place], her primary care doctor there took her off of \"all my medications except my insulin\" and so she certainly did not complete a 6 month course of blood thinners. She is not on blood thinners now. She denies any trauma to her left arm or left leg. She has a history of traumatic brain injury, and that, combined with her discomfort seems to be limiting her history and review of systems somewhat. She reports moderate constant pain in her left bicep, and severe constant pain in her left quadricep, and none of these radiating. She denies any trauma.      REVIEW OF SYSTEMS  See HPI for further details. All other systems are negative. Somewhat limited secondary to physical distress and perhaps traumatic brain injury.    PAST MEDICAL HISTORY   has a past medical history of COPD (chronic obstructive pulmonary disease) (CMS-[facility]); Hypertension; and Type II or unspecified type diabetes mellitus without mention of complication, not stated as uncontrolled.    SOCIAL " HISTORY  Social History     Social History Main Topics   • Smoking status: Current Every Day Smoker     Types: Cigarettes   • Smokeless tobacco: Current User   • Alcohol use No   • Drug use: No   • Sexual activity: Not on file     History   Drug Use No       SURGICAL HISTORY  patient denies any surgical history    CURRENT MEDICATIONS  Home Medications    **Home medications have not yet been reviewed for this encounter**         ALLERGIES  Allergies   Allergen Reactions   • Vesuvius Oil      ALL almonds     • Codeine    • Pcn [Penicillins]    • Simvastatin        PHYSICAL EXAM  VITAL SIGNS: BP (!) 209/117   Pulse 93   Temp 37.4 °C (99.4 °F)   Resp 15   Wt 81 kg (178 lb 9.2 oz)   SpO2 94%   BMI 28.82 kg/m²   Pulse ox interpretation: I interpret this pulse ox as normal.  Constitutional: Alert in moderate distress.  HENT: No signs of trauma, Bilateral external ears normal, Nose normal.   Eyes: Conjunctiva normal, Non-icteric.   Neck: Normal range of motion, Supple, No stridor.   Lymphatic: No lymphadenopathy noted.   Cardiovascular: Regular tachycardic rate and rhythm, no murmurs.   Thorax & Lungs: Normal breath sounds, No respiratory distress, No wheezing, No chest tenderness.   Abdomen: Bowel sounds normal, Soft, No tenderness, No masses, No pulsatile masses. No peritoneal signs.  Skin: Warm, Dry, No erythema, No rash.   Extremities: Intact distal pulses, No edema, No cyanosis. Tenderness to the left bicep and left quadricep. No palpable cords. No obvious skin changes.  Musculoskeletal: Good range of motion in all major joints. No or major deformities noted.   Neurologic: Alert , Normal motor function, Normal sensory function, No focal deficits noted.   Psychiatric: Affect is scattered, Judgment normal, Mood normal.     DIAGNOSTIC STUDIES / PROCEDURES    EKG  Interpreted by me    Rate: 127  SINUS TACHYCARDIA  FRANCES, CONSIDER BIATRIAL ABNORMALITIES  PROBABLE LVH WITH SECONDARY REPOL ABNRM  Compared to ECG 11/28/2017  23:27:17  No significant changes    LABS  Labs Reviewed   COMP METABOLIC PANEL - Abnormal; Notable for the following:        Result Value    Sodium 134 (*)     Glucose 289 (*)     Calcium 11.0 (*)     Alkaline Phosphatase 133 (*)     All other components within normal limits    Narrative:     Indicate which anticoagulants the patient is on:->UNKNOWN   PROTHROMBIN TIME - Abnormal; Notable for the following:     PT 11.4 (*)     INR 0.86 (*)     All other components within normal limits    Narrative:     Indicate which anticoagulants the patient is on:->UNKNOWN   URINALYSIS,CULTURE IF INDICATED - Abnormal; Notable for the following:     Glucose 250 (*)     Occult Blood Moderate (*)     All other components within normal limits   OSMOLALITY SERUM - Abnormal; Notable for the following:     Osmolality Serum 301 (*)     All other components within normal limits    Narrative:     Indicate which anticoagulants the patient is on:->UNKNOWN   VENOUS BLOOD GAS - Abnormal; Notable for the following:     Venous Bg Ph 7.47 (*)     Venous Bg Pco2 36.1 (*)     Venous Bg Po2 56.6 (*)     All other components within normal limits   D-DIMER - Abnormal; Notable for the following:     D-Dimer Screen 260 (*)     All other components within normal limits    Narrative:     Indicate which anticoagulants the patient is on:->UNKNOWN   URINE DRUG SCREEN - Abnormal; Notable for the following:     Opiates Positive (*)     All other components within normal limits   URINE MICROSCOPIC (W/UA) - Abnormal; Notable for the following:     WBC 5-10 (*)     RBC 20-50 (*)     Bacteria Few (*)     All other components within normal limits   ACCU-CHEK GLUCOSE - Abnormal; Notable for the following:     Glucose - Accu-Ck 380 (*)     All other components within normal limits   CBC WITH DIFFERENTIAL    Narrative:     Indicate which anticoagulants the patient is on:->UNKNOWN   TROPONIN    Narrative:     Indicate which anticoagulants the patient is on:->UNKNOWN    BTYPE NATRIURETIC PEPTIDE    Narrative:     Indicate which anticoagulants the patient is on:->UNKNOWN   APTT    Narrative:     Indicate which anticoagulants the patient is on:->UNKNOWN   MAGNESIUM    Narrative:     Indicate which anticoagulants the patient is on:->UNKNOWN   PHOSPHORUS    Narrative:     Indicate which anticoagulants the patient is on:->UNKNOWN   BETA-HYDROXYBUTYRIC ACID    Narrative:     Indicate which anticoagulants the patient is on:->UNKNOWN   KETONES-URINE QUAL(ACETONE URINE QUAL)   HEMOGLOBIN A1C    Narrative:     Indicate which anticoagulants the patient is on:->UNKNOWN   LIPASE    Narrative:     Indicate which anticoagulants the patient is on:->UNKNOWN   ESTIMATED GFR    Narrative:     Indicate which anticoagulants the patient is on:->UNKNOWN   REFRACTOMETER SG   URINE CULTURE(NEW)     All labs reviewed by me.    RADIOLOGY  LE VENOUS DUPLEX (Specify in Comments Left, Right Or Bilateral)   Final Result      UE VENOUS DUPLEX (Specify in Comments Left, Right Or Bilateral)   Final Result      CT-CTA CHEST PULMONARY ARTERY W/ RECONS   Final Result         1. No CT evidence of pulmonary embolism.      2. Bibasilar atelectasis.      3. Unchanged partially calcified nodule in the left thyroid lobe.      4. Questionable left hydronephrosis. Correlate with renal ultrasound.            DX-CHEST-PORTABLE (1 VIEW)   Final Result         1. No acute cardiopulmonary abnormalities are identified.        The radiologist's interpretation of all radiological studies have been reviewed by me.    COURSE & MEDICAL DECISION MAKING  Nursing notes, VS, PMSFHx reviewed in chart.     7:16 PM Patient seen and examined at bedside. Differential diagnosis includes but is not limited to pulmonary embolism, pneumonia, diabetic ketoacidosis, DVT in the left lower extremity, DVT in the left upper extremity. Given this patient's history of incompletely treated DVT/PE, combined with her presenting elevated blood pressure and  "heart rates and shortness of breath and chest discomfort, I have an extremely elevated concern for a potentially large pulmonary embolism. She is not unstable, I don't think empiric lytics are indicated, but I don't think a d-dimer is the right test. I think she needs a CT pulmonary angiogram as well as DVT ultrasounds of the left upper and lower extremities. The uncontrolled blood sugar could also explain her vital sign abnormalities, and she'll be evaluated with appropriate laboratory tests for DKA, though she does not smell of ketones. She'll be treated with a 2 L normal saline bolus and morphine for pain.    8:03 PM patient patient's blood gas, DKA seems very unlikely.    9:43 PM this patient's CT pulmonary angiogram does not show evidence of clot.    10:25 PM this patient's ultrasounds did not show evidence of upper or lower extremity DVT.  Her urine sample is only now being collected.    11:52 PM this patient has been resting comfortably throughout her visit.  Her presenting tachycardia has resolved with fluids.  Her presenting hypertension has also resolved.  There is no evidence of an emergent medical condition after her negative CT pulmonary angiogram, left upper and lower extremity ultrasounds, and laboratory testing.  After this fairly extensive workup, she can be safely discharged to primary care follow-up.      12:06 AM  This patient was informed by her nurse that she was up for discharge and I'd be by to talk to her. She became angry and agitated and tore her IV out and started yelling at staff, and security was called. I returned to talk to her, and she states \"I can't walk.\" She was standing, and bending over to put on her shoes. I informed her she should have waited to talk to me before becoming hostile and violent. She has a stable gait. She declares her intention to call an ambulance and go to [place] because \"I want more testing.\"      The patient will return for new or worsening symptoms and " is stable at the time of discharge.    The patient is referred to a primary physician for blood pressure management, diabetic screening, and for all other preventative health concerns.      DISPOSITION:  Patient will be discharged home in stable condition.    FOLLOW UP:  [address] [address]  [phone]        [facility]  [address]  [place] [address]  [phone]          [facility]  [address]  [phone]            OUTPATIENT MEDICATIONS:  Discharge Medication List as of 3/25/2018 11:58 PM            FINAL IMPRESSION  1. Atypical chest pain    2. Pain of left upper extremity    3. Pain of left lower extremity    4. Hostile behavior    5. Violent behavior

## 2018-03-26 NOTE — ED NOTES
"Pt verbalizes understanding not to drive while taking narcotics she states \"I am going to call an ambulance and go to [place].\" Pt escorted out of red pod with security, pt ambulatory with steady gait.   "

## 2018-03-26 NOTE — ED NOTES
Assumed care of patient. Pt assessed, AAO x 4 . Patient's concerns addressed.  Fall precautions in place.  Pt repositioned and comfortable.  Call light within reach. Will continue to monitor.  ERP at bedside, lab at bedside for venous gas.

## 2018-03-26 NOTE — ED TRIAGE NOTES
WC to triage w/ c/o chest pain, LUE pain, sob, LLE pain, onset today.  Pt reports a hx of dvt to left leg.

## 2018-03-27 LAB
BACTERIA UR CULT: NORMAL
SIGNIFICANT IND 70042: NORMAL
SITE SITE: NORMAL
SOURCE SOURCE: NORMAL